# Patient Record
Sex: MALE | Race: WHITE | Employment: FULL TIME | URBAN - METROPOLITAN AREA
[De-identification: names, ages, dates, MRNs, and addresses within clinical notes are randomized per-mention and may not be internally consistent; named-entity substitution may affect disease eponyms.]

---

## 2020-12-04 ENCOUNTER — TELEPHONE (OUTPATIENT)
Dept: SURGERY | Facility: CLINIC | Age: 50
End: 2020-12-04

## 2020-12-04 NOTE — TELEPHONE ENCOUNTER
Received referral for patient to see Dr. Asad Phillip for lobectomy and possible total thyroidectomy form Dr. Laz Bonilla.  Called patient 12/4 left message to call back to schedule.    Started a chart with information provided, will need to complete chart.

## 2021-01-05 ENCOUNTER — VIRTUAL VISIT (OUTPATIENT)
Dept: SURGERY | Facility: CLINIC | Age: 51
End: 2021-01-05
Payer: COMMERCIAL

## 2021-01-05 VITALS — HEIGHT: 72 IN | WEIGHT: 260 LBS | BODY MASS INDEX: 35.21 KG/M2

## 2021-01-05 DIAGNOSIS — E04.2 NONTOXIC MULTINODULAR GOITER: ICD-10-CM

## 2021-01-05 PROCEDURE — 99204 OFFICE O/P NEW MOD 45 MIN: CPT | Performed by: SURGERY

## 2021-01-05 RX ORDER — MULTIPLE VITAMINS W/ MINERALS TAB 9MG-400MCG
1 TAB ORAL DAILY
COMMUNITY

## 2021-01-05 ASSESSMENT — MIFFLIN-ST. JEOR: SCORE: 2077.35

## 2021-01-05 NOTE — PROGRESS NOTES
Kimberly Salinas is a 50 year old male who is being evaluated via a billable telephone visit.      What phone number would you like to be contacted at? [phone]    Assessment & Plan       Pleasant healthy 50-year-old gentleman with a nontoxic multinodular goiter.  This has been relatively asymptomatic but ultrasound revealed sizable nodules in both lobes.  Biopsy of the dominant right lobe nodule suggested atypia of uncertain significance and gene sequencing was suspicious for malignancy.  Given the bilaterality of the nodules and the level of suspicion for cancer, patient was favoring total thyroidectomy.  I think this makes the most sense.  Patient did not want to have a subsequent surgery for malignancy or growing nodules.  Patient's wife and mother both take thyroid hormone replacement and he understands this is not particularly onerous.  We discussed the details of total thyroidectomy as well as the inherent risks.  Risks include bleeding, infection, hypocalcemia, and injury to the recurrent laryngeal nerve.  I did discuss my use of the recurrent laryngeal nerve monitor.  Patient was interested in scheduling surgery in the next 1 to 2 months.       BMI:   Estimated body mass index is 35.26 kg/m  as calculated from the following:    Height as of this encounter: 1.829 m (6').    Weight as of this encounter: 117.9 kg (260 lb).   Weight management plan: Discussed healthy diet and exercise guidelines      Regular exercise    No follow-ups on file.    Asad Phillip MD, MD  [facility]     Kimberly Salinas is a 50 year old male who presents to clinic today for the following health issues multinodular thyroid with suspicious features.    HPI       Pleasant healthy 50-year-old gentleman who was noted to have an enlarged thyroid.  This was asymptomatic.  Patient has no history of surgery to the head or neck.  Ultrasound of the thyroid was performed and this revealed  sizable nodules bilaterally, the largest being on the right.  The 2 largest nodules were biopsied and suggested atypia of uncertain significance.  These were then sent for gene assay and were suspicious for malignancy.  Patient has no family history of thyroid cancer but 2 family members take thyroid hormone replacement.  Patient is clinically euthyroid.  He is an avid cyclist and is quite fit.    Review of Systems   Constitutional, HEENT, cardiovascular, pulmonary, gi and gu systems are negative, except as otherwise noted.      Objective           Vitals:  No vitals were obtained today due to virtual visit.    Physical Exam   healthy, alert and no distress  PSYCH: Alert and oriented times 3; coherent speech, normal   rate and volume, able to articulate logical thoughts, able   to abstract reason, no tangential thoughts, no hallucinations   or delusions  His affect is normal  RESP: No cough, no audible wheezing, able to talk in full sentences  Remainder of exam unable to be completed due to telephone visits    Thyroid ultrasound reviewed.  Multiple sizable thyroid nodules bilaterally.        Phone call duration: 45 minutes

## 2021-01-06 ENCOUNTER — TELEPHONE (OUTPATIENT)
Dept: SURGERY | Facility: CLINIC | Age: 51
End: 2021-01-06

## 2021-01-06 NOTE — TELEPHONE ENCOUNTER
Type of surgery: Total thyroidectomy  Location of surgery: [facility]  Date and time of surgery: 2/2/21 at 12:30pm  Surgeon: Asad Padilla  Pre-Op Appt Date: Patient to schedule  Post-Op Appt Date: Patient to schedule   Packet sent out: Yes  Pre-cert/Authorization completed:  Not Applicable  Date: 1/6/21

## 2021-01-13 DIAGNOSIS — Z11.59 ENCOUNTER FOR SCREENING FOR OTHER VIRAL DISEASES: Primary | ICD-10-CM

## 2021-01-29 DIAGNOSIS — Z11.59 ENCOUNTER FOR SCREENING FOR OTHER VIRAL DISEASES: ICD-10-CM

## 2021-01-29 LAB
LABORATORY COMMENT REPORT: NORMAL
SARS-COV-2 RNA RESP QL NAA+PROBE: NEGATIVE
SARS-COV-2 RNA RESP QL NAA+PROBE: NORMAL
SPECIMEN SOURCE: NORMAL
SPECIMEN SOURCE: NORMAL

## 2021-01-29 PROCEDURE — U0003 INFECTIOUS AGENT DETECTION BY NUCLEIC ACID (DNA OR RNA); SEVERE ACUTE RESPIRATORY SYNDROME CORONAVIRUS 2 (SARS-COV-2) (CORONAVIRUS DISEASE [COVID-19]), AMPLIFIED PROBE TECHNIQUE, MAKING USE OF HIGH THROUGHPUT TECHNOLOGIES AS DESCRIBED BY CMS-2020-01-R: HCPCS | Performed by: SURGERY

## 2021-01-29 PROCEDURE — U0005 INFEC AGEN DETEC AMPLI PROBE: HCPCS | Performed by: SURGERY

## 2021-02-01 NOTE — PROGRESS NOTES
PTA medications updated by Medication Scribe prior to surgery via phone call with patient      -LAST DOSES ENTERED BY NURSE-    Comments:    Medication history sources: Patient, Surescripts and H&P  Medication history source reliability: Good  Adherence assessment: N/A Not Observed    Significant changes made to the medication list:  None      Additional medication history information:   None        Prior to Admission medications    Medication Sig Last Dose Taking? Auth Provider   GLUCOSAMINE HCL PO Take 1 Dose by mouth daily as needed  MORE THAN 1 WEEK at PRN Yes Reported, Patient   multivitamin w/minerals (MULTI-VITAMIN) tablet Take 1 tablet by mouth daily  Yes Reported, Patient   VITAMIN D PO Take 1 Dose by mouth daily   Yes Reported, Patient

## 2021-02-02 ENCOUNTER — HOSPITAL ENCOUNTER (OUTPATIENT)
Facility: CLINIC | Age: 51
Discharge: HOME | End: 2021-02-03
Attending: SURGERY | Admitting: SURGERY
Payer: COMMERCIAL

## 2021-02-02 ENCOUNTER — SURGERY (OUTPATIENT)
Age: 51
End: 2021-02-02
Payer: COMMERCIAL

## 2021-02-02 ENCOUNTER — APPOINTMENT (OUTPATIENT)
Dept: SURGERY | Facility: PHYSICIAN GROUP | Age: 51
End: 2021-02-02
Payer: COMMERCIAL

## 2021-02-02 ENCOUNTER — ANESTHESIA EVENT (OUTPATIENT)
Dept: SURGERY | Facility: CLINIC | Age: 51
End: 2021-02-02
Payer: COMMERCIAL

## 2021-02-02 ENCOUNTER — ANESTHESIA (OUTPATIENT)
Dept: SURGERY | Facility: CLINIC | Age: 51
End: 2021-02-02
Payer: COMMERCIAL

## 2021-02-02 DIAGNOSIS — E04.2 NONTOXIC MULTINODULAR GOITER: ICD-10-CM

## 2021-02-02 DIAGNOSIS — G89.18 ACUTE POST-OPERATIVE PAIN: Primary | ICD-10-CM

## 2021-02-02 LAB
CALCIUM SERPL-MCNC: 8.7 MG/DL (ref 8.5–10.1)
CREAT SERPL-MCNC: 0.96 MG/DL (ref 0.66–1.25)
GFR SERPL CREATININE-BSD FRML MDRD: >90 ML/MIN/{1.73_M2}

## 2021-02-02 PROCEDURE — 36415 COLL VENOUS BLD VENIPUNCTURE: CPT | Performed by: PHYSICIAN ASSISTANT

## 2021-02-02 PROCEDURE — 60240 REMOVAL OF THYROID: CPT | Performed by: SURGERY

## 2021-02-02 PROCEDURE — 250N000009 HC RX 250: Performed by: NURSE ANESTHETIST, CERTIFIED REGISTERED

## 2021-02-02 PROCEDURE — 710N000009 HC RECOVERY PHASE 1, LEVEL 1, PER MIN: Performed by: SURGERY

## 2021-02-02 PROCEDURE — 82565 ASSAY OF CREATININE: CPT | Performed by: PHYSICIAN ASSISTANT

## 2021-02-02 PROCEDURE — 370N000017 HC ANESTHESIA TECHNICAL FEE, PER MIN: Performed by: SURGERY

## 2021-02-02 PROCEDURE — 250N000013 HC RX MED GY IP 250 OP 250 PS 637: Performed by: SURGERY

## 2021-02-02 PROCEDURE — 250N000011 HC RX IP 250 OP 636: Performed by: SURGERY

## 2021-02-02 PROCEDURE — 258N000003 HC RX IP 258 OP 636: Performed by: PHYSICIAN ASSISTANT

## 2021-02-02 PROCEDURE — 82310 ASSAY OF CALCIUM: CPT | Performed by: PHYSICIAN ASSISTANT

## 2021-02-02 PROCEDURE — 250N000011 HC RX IP 250 OP 636: Performed by: NURSE ANESTHETIST, CERTIFIED REGISTERED

## 2021-02-02 PROCEDURE — 258N000003 HC RX IP 258 OP 636: Performed by: NURSE ANESTHETIST, CERTIFIED REGISTERED

## 2021-02-02 PROCEDURE — 250N000009 HC RX 250: Performed by: SURGERY

## 2021-02-02 PROCEDURE — 60240 REMOVAL OF THYROID: CPT | Mod: AS | Performed by: PHYSICIAN ASSISTANT

## 2021-02-02 PROCEDURE — 88307 TISSUE EXAM BY PATHOLOGIST: CPT | Mod: 26 | Performed by: PATHOLOGY

## 2021-02-02 PROCEDURE — 250N000011 HC RX IP 250 OP 636: Performed by: PHYSICIAN ASSISTANT

## 2021-02-02 PROCEDURE — 999N000141 HC STATISTIC PRE-PROCEDURE NURSING ASSESSMENT: Performed by: SURGERY

## 2021-02-02 PROCEDURE — 250N000013 HC RX MED GY IP 250 OP 250 PS 637: Performed by: PHYSICIAN ASSISTANT

## 2021-02-02 PROCEDURE — 88307 TISSUE EXAM BY PATHOLOGIST: CPT | Mod: TC | Performed by: SURGERY

## 2021-02-02 PROCEDURE — 360N000076 HC SURGERY LEVEL 3, PER MIN: Performed by: SURGERY

## 2021-02-02 PROCEDURE — 250N000011 HC RX IP 250 OP 636: Performed by: ANESTHESIOLOGY

## 2021-02-02 PROCEDURE — 272N000001 HC OR GENERAL SUPPLY STERILE: Performed by: SURGERY

## 2021-02-02 RX ORDER — ONDANSETRON 4 MG/1
4 TABLET, ORALLY DISINTEGRATING ORAL EVERY 30 MIN PRN
Status: DISCONTINUED | OUTPATIENT
Start: 2021-02-02 | End: 2021-02-02 | Stop reason: HOSPADM

## 2021-02-02 RX ORDER — SODIUM CHLORIDE, SODIUM LACTATE, POTASSIUM CHLORIDE, CALCIUM CHLORIDE 600; 310; 30; 20 MG/100ML; MG/100ML; MG/100ML; MG/100ML
INJECTION, SOLUTION INTRAVENOUS CONTINUOUS PRN
Status: DISCONTINUED | OUTPATIENT
Start: 2021-02-02 | End: 2021-02-02

## 2021-02-02 RX ORDER — NALOXONE HYDROCHLORIDE 0.4 MG/ML
0.2 INJECTION, SOLUTION INTRAMUSCULAR; INTRAVENOUS; SUBCUTANEOUS
Status: DISCONTINUED | OUTPATIENT
Start: 2021-02-02 | End: 2021-02-02 | Stop reason: HOSPADM

## 2021-02-02 RX ORDER — ONDANSETRON 2 MG/ML
INJECTION INTRAMUSCULAR; INTRAVENOUS PRN
Status: DISCONTINUED | OUTPATIENT
Start: 2021-02-02 | End: 2021-02-02

## 2021-02-02 RX ORDER — ONDANSETRON 2 MG/ML
4 INJECTION INTRAMUSCULAR; INTRAVENOUS EVERY 6 HOURS PRN
Status: DISCONTINUED | OUTPATIENT
Start: 2021-02-02 | End: 2021-02-03 | Stop reason: HOSPADM

## 2021-02-02 RX ORDER — FENTANYL CITRATE 50 UG/ML
INJECTION, SOLUTION INTRAMUSCULAR; INTRAVENOUS PRN
Status: DISCONTINUED | OUTPATIENT
Start: 2021-02-02 | End: 2021-02-02

## 2021-02-02 RX ORDER — CLINDAMYCIN PHOSPHATE 900 MG/50ML
900 INJECTION, SOLUTION INTRAVENOUS SEE ADMIN INSTRUCTIONS
Status: DISCONTINUED | OUTPATIENT
Start: 2021-02-02 | End: 2021-02-02 | Stop reason: HOSPADM

## 2021-02-02 RX ORDER — METOPROLOL TARTRATE 1 MG/ML
1-2 INJECTION, SOLUTION INTRAVENOUS EVERY 5 MIN PRN
Status: DISCONTINUED | OUTPATIENT
Start: 2021-02-02 | End: 2021-02-02 | Stop reason: HOSPADM

## 2021-02-02 RX ORDER — ONDANSETRON 2 MG/ML
4 INJECTION INTRAMUSCULAR; INTRAVENOUS EVERY 30 MIN PRN
Status: DISCONTINUED | OUTPATIENT
Start: 2021-02-02 | End: 2021-02-02 | Stop reason: HOSPADM

## 2021-02-02 RX ORDER — LEVOTHYROXINE SODIUM 100 UG/1
200 TABLET ORAL
Status: DISCONTINUED | OUTPATIENT
Start: 2021-02-03 | End: 2021-02-03 | Stop reason: HOSPADM

## 2021-02-02 RX ORDER — PROPOFOL 10 MG/ML
INJECTION, EMULSION INTRAVENOUS CONTINUOUS PRN
Status: DISCONTINUED | OUTPATIENT
Start: 2021-02-02 | End: 2021-02-02

## 2021-02-02 RX ORDER — DEXAMETHASONE SODIUM PHOSPHATE 4 MG/ML
INJECTION, SOLUTION INTRA-ARTICULAR; INTRALESIONAL; INTRAMUSCULAR; INTRAVENOUS; SOFT TISSUE PRN
Status: DISCONTINUED | OUTPATIENT
Start: 2021-02-02 | End: 2021-02-02

## 2021-02-02 RX ORDER — DEXAMETHASONE SODIUM PHOSPHATE 4 MG/ML
4 INJECTION, SOLUTION INTRA-ARTICULAR; INTRALESIONAL; INTRAMUSCULAR; INTRAVENOUS; SOFT TISSUE EVERY 10 MIN PRN
Status: DISCONTINUED | OUTPATIENT
Start: 2021-02-02 | End: 2021-02-02 | Stop reason: HOSPADM

## 2021-02-02 RX ORDER — NALOXONE HYDROCHLORIDE 0.4 MG/ML
0.4 INJECTION, SOLUTION INTRAMUSCULAR; INTRAVENOUS; SUBCUTANEOUS
Status: DISCONTINUED | OUTPATIENT
Start: 2021-02-02 | End: 2021-02-03 | Stop reason: HOSPADM

## 2021-02-02 RX ORDER — FENTANYL CITRATE 50 UG/ML
25-50 INJECTION, SOLUTION INTRAMUSCULAR; INTRAVENOUS
Status: DISCONTINUED | OUTPATIENT
Start: 2021-02-02 | End: 2021-02-02 | Stop reason: HOSPADM

## 2021-02-02 RX ORDER — HYDRALAZINE HYDROCHLORIDE 20 MG/ML
2.5-5 INJECTION INTRAMUSCULAR; INTRAVENOUS EVERY 10 MIN PRN
Status: DISCONTINUED | OUTPATIENT
Start: 2021-02-02 | End: 2021-02-02 | Stop reason: HOSPADM

## 2021-02-02 RX ORDER — NALOXONE HYDROCHLORIDE 0.4 MG/ML
0.4 INJECTION, SOLUTION INTRAMUSCULAR; INTRAVENOUS; SUBCUTANEOUS
Status: DISCONTINUED | OUTPATIENT
Start: 2021-02-02 | End: 2021-02-02 | Stop reason: HOSPADM

## 2021-02-02 RX ORDER — OXYCODONE HCL 5 MG/5 ML
5 SOLUTION, ORAL ORAL EVERY 4 HOURS PRN
Status: DISCONTINUED | OUTPATIENT
Start: 2021-02-02 | End: 2021-02-02

## 2021-02-02 RX ORDER — MAGNESIUM HYDROXIDE 1200 MG/15ML
LIQUID ORAL PRN
Status: DISCONTINUED | OUTPATIENT
Start: 2021-02-02 | End: 2021-02-02 | Stop reason: HOSPADM

## 2021-02-02 RX ORDER — SODIUM CHLORIDE 9 MG/ML
INJECTION, SOLUTION INTRAVENOUS CONTINUOUS
Status: DISCONTINUED | OUTPATIENT
Start: 2021-02-02 | End: 2021-02-03 | Stop reason: HOSPADM

## 2021-02-02 RX ORDER — OXYCODONE HYDROCHLORIDE 5 MG/1
5-10 TABLET ORAL
Qty: 10 TABLET | Refills: 0 | Status: SHIPPED | OUTPATIENT
Start: 2021-02-02 | End: 2021-02-11

## 2021-02-02 RX ORDER — SODIUM CHLORIDE, SODIUM LACTATE, POTASSIUM CHLORIDE, CALCIUM CHLORIDE 600; 310; 30; 20 MG/100ML; MG/100ML; MG/100ML; MG/100ML
INJECTION, SOLUTION INTRAVENOUS CONTINUOUS
Status: DISCONTINUED | OUTPATIENT
Start: 2021-02-02 | End: 2021-02-02 | Stop reason: HOSPADM

## 2021-02-02 RX ORDER — BUPIVACAINE HYDROCHLORIDE 2.5 MG/ML
INJECTION, SOLUTION INFILTRATION; PERINEURAL PRN
Status: DISCONTINUED | OUTPATIENT
Start: 2021-02-02 | End: 2021-02-02 | Stop reason: HOSPADM

## 2021-02-02 RX ORDER — NALOXONE HYDROCHLORIDE 0.4 MG/ML
0.2 INJECTION, SOLUTION INTRAMUSCULAR; INTRAVENOUS; SUBCUTANEOUS
Status: DISCONTINUED | OUTPATIENT
Start: 2021-02-02 | End: 2021-02-03 | Stop reason: HOSPADM

## 2021-02-02 RX ORDER — CALCIUM CARBONATE 500(1250)
1000 TABLET ORAL 2 TIMES DAILY WITH MEALS
Status: DISCONTINUED | OUTPATIENT
Start: 2021-02-02 | End: 2021-02-03 | Stop reason: HOSPADM

## 2021-02-02 RX ORDER — OXYCODONE HYDROCHLORIDE 5 MG/1
5-10 TABLET ORAL
Status: DISCONTINUED | OUTPATIENT
Start: 2021-02-02 | End: 2021-02-03 | Stop reason: HOSPADM

## 2021-02-02 RX ORDER — PROPOFOL 10 MG/ML
INJECTION, EMULSION INTRAVENOUS PRN
Status: DISCONTINUED | OUTPATIENT
Start: 2021-02-02 | End: 2021-02-02

## 2021-02-02 RX ORDER — AMOXICILLIN 250 MG
1-2 CAPSULE ORAL 2 TIMES DAILY
Qty: 30 TABLET | Refills: 0 | Status: SHIPPED | OUTPATIENT
Start: 2021-02-02 | End: 2021-02-11

## 2021-02-02 RX ORDER — LIDOCAINE HYDROCHLORIDE 20 MG/ML
INJECTION, SOLUTION INFILTRATION; PERINEURAL PRN
Status: DISCONTINUED | OUTPATIENT
Start: 2021-02-02 | End: 2021-02-02

## 2021-02-02 RX ORDER — ALBUTEROL SULFATE 0.83 MG/ML
2.5 SOLUTION RESPIRATORY (INHALATION) EVERY 4 HOURS PRN
Status: DISCONTINUED | OUTPATIENT
Start: 2021-02-02 | End: 2021-02-02 | Stop reason: HOSPADM

## 2021-02-02 RX ORDER — KETOROLAC TROMETHAMINE 15 MG/ML
15 INJECTION, SOLUTION INTRAMUSCULAR; INTRAVENOUS EVERY 6 HOURS
Status: COMPLETED | OUTPATIENT
Start: 2021-02-02 | End: 2021-02-03

## 2021-02-02 RX ORDER — HYDROMORPHONE HYDROCHLORIDE 1 MG/ML
.3-.5 INJECTION, SOLUTION INTRAMUSCULAR; INTRAVENOUS; SUBCUTANEOUS EVERY 10 MIN PRN
Status: DISCONTINUED | OUTPATIENT
Start: 2021-02-02 | End: 2021-02-02 | Stop reason: HOSPADM

## 2021-02-02 RX ORDER — GLYCOPYRROLATE 0.2 MG/ML
INJECTION, SOLUTION INTRAMUSCULAR; INTRAVENOUS PRN
Status: DISCONTINUED | OUTPATIENT
Start: 2021-02-02 | End: 2021-02-02

## 2021-02-02 RX ORDER — FAMOTIDINE 20 MG/1
20 TABLET, FILM COATED ORAL 2 TIMES DAILY
Status: DISCONTINUED | OUTPATIENT
Start: 2021-02-02 | End: 2021-02-03 | Stop reason: HOSPADM

## 2021-02-02 RX ORDER — EPHEDRINE SULFATE 50 MG/ML
INJECTION, SOLUTION INTRAMUSCULAR; INTRAVENOUS; SUBCUTANEOUS PRN
Status: DISCONTINUED | OUTPATIENT
Start: 2021-02-02 | End: 2021-02-02

## 2021-02-02 RX ORDER — LIDOCAINE 40 MG/G
CREAM TOPICAL
Status: DISCONTINUED | OUTPATIENT
Start: 2021-02-02 | End: 2021-02-03 | Stop reason: HOSPADM

## 2021-02-02 RX ORDER — CLINDAMYCIN PHOSPHATE 900 MG/50ML
900 INJECTION, SOLUTION INTRAVENOUS
Status: DISCONTINUED | OUTPATIENT
Start: 2021-02-02 | End: 2021-02-02 | Stop reason: HOSPADM

## 2021-02-02 RX ORDER — PROCHLORPERAZINE MALEATE 5 MG
10 TABLET ORAL EVERY 6 HOURS PRN
Status: DISCONTINUED | OUTPATIENT
Start: 2021-02-02 | End: 2021-02-03 | Stop reason: HOSPADM

## 2021-02-02 RX ORDER — MEPERIDINE HYDROCHLORIDE 25 MG/ML
12.5 INJECTION INTRAMUSCULAR; INTRAVENOUS; SUBCUTANEOUS
Status: DISCONTINUED | OUTPATIENT
Start: 2021-02-02 | End: 2021-02-02 | Stop reason: HOSPADM

## 2021-02-02 RX ORDER — ONDANSETRON 4 MG/1
4 TABLET, ORALLY DISINTEGRATING ORAL EVERY 6 HOURS PRN
Status: DISCONTINUED | OUTPATIENT
Start: 2021-02-02 | End: 2021-02-03 | Stop reason: HOSPADM

## 2021-02-02 RX ORDER — HYDROXYZINE HYDROCHLORIDE 10 MG/1
10 TABLET, FILM COATED ORAL EVERY 6 HOURS PRN
Status: DISCONTINUED | OUTPATIENT
Start: 2021-02-02 | End: 2021-02-03 | Stop reason: HOSPADM

## 2021-02-02 RX ADMIN — SUCCINYLCHOLINE CHLORIDE 80 MG: 20 INJECTION, SOLUTION INTRAMUSCULAR; INTRAVENOUS; PARENTERAL at 12:25

## 2021-02-02 RX ADMIN — Medication 10 MG: at 13:04

## 2021-02-02 RX ADMIN — HYDROMORPHONE HYDROCHLORIDE 0.5 MG: 1 INJECTION, SOLUTION INTRAMUSCULAR; INTRAVENOUS; SUBCUTANEOUS at 14:38

## 2021-02-02 RX ADMIN — SODIUM CHLORIDE: 9 INJECTION, SOLUTION INTRAVENOUS at 18:22

## 2021-02-02 RX ADMIN — Medication 5 MG: at 13:07

## 2021-02-02 RX ADMIN — SODIUM CHLORIDE, POTASSIUM CHLORIDE, SODIUM LACTATE AND CALCIUM CHLORIDE: 600; 310; 30; 20 INJECTION, SOLUTION INTRAVENOUS at 12:15

## 2021-02-02 RX ADMIN — Medication 5 MG: at 13:28

## 2021-02-02 RX ADMIN — REMIFENTANIL HYDROCHLORIDE 0.1 MCG/KG/MIN: 1 INJECTION, POWDER, LYOPHILIZED, FOR SOLUTION INTRAVENOUS at 12:30

## 2021-02-02 RX ADMIN — SODIUM CHLORIDE 1000 ML: 900 IRRIGANT IRRIGATION at 12:55

## 2021-02-02 RX ADMIN — Medication 10 MG: at 12:48

## 2021-02-02 RX ADMIN — KETOROLAC TROMETHAMINE 15 MG: 15 INJECTION, SOLUTION INTRAMUSCULAR; INTRAVENOUS at 23:29

## 2021-02-02 RX ADMIN — PHENYLEPHRINE HYDROCHLORIDE 50 MCG: 10 INJECTION INTRAVENOUS at 13:28

## 2021-02-02 RX ADMIN — KETOROLAC TROMETHAMINE 15 MG: 15 INJECTION, SOLUTION INTRAMUSCULAR; INTRAVENOUS at 18:22

## 2021-02-02 RX ADMIN — Medication 5 MG: at 14:43

## 2021-02-02 RX ADMIN — PROPOFOL 200 MCG/KG/MIN: 10 INJECTION, EMULSION INTRAVENOUS at 12:29

## 2021-02-02 RX ADMIN — PHENYLEPHRINE HYDROCHLORIDE 50 MCG: 10 INJECTION INTRAVENOUS at 13:07

## 2021-02-02 RX ADMIN — CLINDAMYCIN PHOSPHATE 900 MG: 900 INJECTION, SOLUTION INTRAVENOUS at 12:35

## 2021-02-02 RX ADMIN — FAMOTIDINE 20 MG: 20 TABLET ORAL at 20:29

## 2021-02-02 RX ADMIN — DEXMEDETOMIDINE HYDROCHLORIDE 20 MCG: 100 INJECTION, SOLUTION INTRAVENOUS at 15:16

## 2021-02-02 RX ADMIN — SODIUM CHLORIDE, SODIUM LACTATE, POTASSIUM CHLORIDE, CALCIUM CHLORIDE: 600; 310; 30; 20 INJECTION, SOLUTION INTRAVENOUS at 12:30

## 2021-02-02 RX ADMIN — PROPOFOL 300 MG: 10 INJECTION, EMULSION INTRAVENOUS at 12:25

## 2021-02-02 RX ADMIN — LIDOCAINE HYDROCHLORIDE 100 MG: 20 INJECTION, SOLUTION INFILTRATION; PERINEURAL at 12:25

## 2021-02-02 RX ADMIN — Medication 5 MG: at 12:52

## 2021-02-02 RX ADMIN — MIDAZOLAM HYDROCHLORIDE 2 MG: 1 INJECTION, SOLUTION INTRAMUSCULAR; INTRAVENOUS at 15:18

## 2021-02-02 RX ADMIN — ONDANSETRON 4 MG: 2 INJECTION INTRAMUSCULAR; INTRAVENOUS at 14:41

## 2021-02-02 RX ADMIN — BUPIVACAINE HYDROCHLORIDE 18 ML: 2.5 INJECTION, SOLUTION EPIDURAL; INFILTRATION; INTRACAUDAL; PERINEURAL at 14:55

## 2021-02-02 RX ADMIN — DEXAMETHASONE SODIUM PHOSPHATE 4 MG: 4 INJECTION, SOLUTION INTRA-ARTICULAR; INTRALESIONAL; INTRAMUSCULAR; INTRAVENOUS; SOFT TISSUE at 12:31

## 2021-02-02 RX ADMIN — FENTANYL CITRATE 100 MCG: 50 INJECTION, SOLUTION INTRAMUSCULAR; INTRAVENOUS at 12:25

## 2021-02-02 RX ADMIN — Medication 1 LOZENGE: at 21:13

## 2021-02-02 RX ADMIN — CALCIUM 1000 MG: 500 TABLET ORAL at 20:29

## 2021-02-02 RX ADMIN — GLYCOPYRROLATE 0.1 MG: 0.2 INJECTION, SOLUTION INTRAMUSCULAR; INTRAVENOUS at 13:10

## 2021-02-02 RX ADMIN — MIDAZOLAM 2 MG: 1 INJECTION INTRAMUSCULAR; INTRAVENOUS at 12:18

## 2021-02-02 RX ADMIN — GLYCOPYRROLATE 0.2 MG: 0.2 INJECTION, SOLUTION INTRAMUSCULAR; INTRAVENOUS at 13:08

## 2021-02-02 ASSESSMENT — MIFFLIN-ST. JEOR: SCORE: 2136.77

## 2021-02-02 NOTE — OR NURSING
Talked to Anthony LOAIZA regarding blood oozing from patient's urethra. Blood present on arrival to pacu. Weiss was removed in the OR prior to pacu arrival. Patient reports urge to void, assisted with urinal but no produced no urine. Per Anthony, too much blood would be in patient oozing through athletic supporter and 5 layers of gauze. States we may use Urojet if patient reports soreness to penis tip, but patient declines.

## 2021-02-02 NOTE — ANESTHESIA PREPROCEDURE EVALUATION
"Anesthesia Pre-Procedure Evaluation    Patient: Kimberly Salinas   MRN: [id number] : 1970        Preoperative Diagnosis: Nontoxic multinodular goiter [E04.2]   Procedure : Procedure(s):  TOTAL THYROIDECTOMY     Past Medical History:   Diagnosis Date     Enlargement of thyroid       Past Surgical History:   Procedure Laterality Date     ENT SURGERY      wisdom teeth removal     neck lumpectomy      benign     ORTHOPEDIC SURGERY      ORIF left wrist      Allergies   Allergen Reactions     Penicillins      Per patient taken as a child and \"almost killed\" him.      Social History     Tobacco Use     Smoking status: Never Smoker     Smokeless tobacco: Never Used   Substance Use Topics     Alcohol use: Yes     Comment: 2-4 times a week      Wt Readings from Last 1 Encounters:   21 123.9 kg (273 lb 1.6 oz)        Anesthesia Evaluation            ROS/MED HX  ENT/Pulmonary: Comment: Multinodular goiter suspicious for malignancy   (-) sleep apnea   Neurologic:       Cardiovascular:       METS/Exercise Tolerance:     Hematologic:       Musculoskeletal:       GI/Hepatic:    (-) GERD   Renal/Genitourinary:       Endo:       Psychiatric/Substance Use:       Infectious Disease:       Malignancy:       Other:               OUTSIDE LABS:  CBC: No results found for: WBC, HGB, HCT, PLT  BMP: No results found for: NA, POTASSIUM, CHLORIDE, CO2, BUN, CR, GLC  COAGS: No results found for: PTT, INR, FIBR  POC: No results found for: BGM, HCG, HCGS  HEPATIC: No results found for: ALBUMIN, PROTTOTAL, ALT, AST, GGT, ALKPHOS, BILITOTAL, BILIDIRECT, ANITA  OTHER: No results found for: PH, LACT, A1C, MARYA, PHOS, MAG, LIPASE, AMYLASE, TSH, T4, T3, CRP, SED    Anesthesia Plan     History & Physical Review      ASA Status:  2. NPO Status:  NPO Appropriate. .  Plan for General with Intravenous induction. Maintenance will be Balanced.     Additional equipment: Videolaryngoscope and 2nd IV.    PONV prophylaxis:  Ondansetron (or other " 5HT-3), Background Propofol Infusion and Dexamethasone or Solumedrol.    Comments: Propofol and Reminfentanil gtt.       Consents  Anesthesia Plan(s) and associated risks, benefits, and realistic alternatives discussed.    Questions answered and patient/representative(s) expressed understanding.    Discussed with:  Patient.             Postoperative Care  Postoperative pain management: IV analgesics and Oral pain medications.           Ivonne Stafford MD, MD

## 2021-02-02 NOTE — ANESTHESIA CARE TRANSFER NOTE
Patient: Kimberly Salinas    Procedure(s):  TOTAL THYROIDECTOMY    Diagnosis: Nontoxic multinodular goiter [E04.2]  Diagnosis Additional Information: No value filed.    Anesthesia Type:   General     Note:      Level of Consciousness: awake  Oxygen Supplementation: face mask  Level of Supplemental Oxygen: 6  Independent Airway: airway patency satisfactory and stable  Dentition: dentition unchanged  Vital Signs Stable: post-procedure vital signs reviewed and stable  Report to RN Given: handoff report given  Patient transferred to: PACU    Handoff Report: Identifed the Patient, Identified the Reponsible Provider, Reviewed the pertinent medical history, Discussed the surgical course, Reviewed Intra-OP anesthesia mangement and issues during anesthesia, Set expectations for post-procedure period and Allowed opportunity for questions and acknowledgement of understanding      Vitals: (Last set prior to Anesthesia Care Transfer)  CRNA VITALS  2/2/2021 1437 - 2/2/2021 1521      2/2/2021             Pulse:  90    SpO2:  94 %    Resp Rate (set):  10        Electronically Signed By: EVERARDO Blakely CRNA  February 2, 2021  3:21 PM

## 2021-02-02 NOTE — OR NURSING
Report called to Arabella NAJERA. Patient transferred to [facility] on cart with belongings. Dr. Murry OK to transfer to floor with /100. Patient states this is his baseline BP. Wife Marge notified of transfer.

## 2021-02-02 NOTE — ANESTHESIA POSTPROCEDURE EVALUATION
Patient: Kimberly Salinas    Procedure(s):  TOTAL THYROIDECTOMY    Diagnosis:Nontoxic multinodular goiter [E04.2]  Diagnosis Additional Information: No value filed.    Anesthesia Type:  General    Note:     Postop Pain Control: Uneventful            Sign Out: Well controlled pain   PONV: No   Neuro/Psych: Uneventful            Sign Out: Acceptable/Baseline neuro status   Airway/Respiratory: Uneventful            Sign Out: Acceptable/Baseline resp. status   CV/Hemodynamics: Uneventful            Sign Out: Acceptable CV status   Other NRE: NONE   DID A NON-ROUTINE EVENT OCCUR? No         Last vitals:  Vitals:    02/02/21 1545 02/02/21 1600 02/02/21 1615   BP: (!) 161/103 (!) 157/106 (!) 158/100   Pulse: 92 71 66   Resp: 19 20 16   Temp:   36.5  C (97.7  F)   SpO2: 97% 94% 99%       Electronically Signed By: Ivonne Stafford MD, MD  February 2, 2021  4:21 PM

## 2021-02-02 NOTE — ANESTHESIA PROCEDURE NOTES
Airway   Date/Time: 2/2/2021 12:29 PM   Patient location during procedure: OR  Staff -   CRNA: Rika Mayen APRN CRNA  Performed By: CRNA    Consent for Airway   Urgency: elective    Indications and Patient Condition  Indications for airway management: trice-procedural  Induction type:RSIMask difficulty assessment: 0 - not attempted    Final Airway Details  Final airway type: endotracheal airway  Successful airway:NIM  Endotracheal Airway Details   ETT size (mm): 8.0  Cuffed: yes  Successful intubation technique: video laryngoscopy  Grade View of Cords: 1  Adjucts: stylet  Measured from: gums/teeth  Secured with: pink tape  Bite block used: None    Post intubation assessment   Placement verified by: capnometry, equal breath sounds and chest rise   Number of attempts at approach: 1  Secured with:pink tape  Ease of procedure: easy  Dentition: Intact and Unchanged

## 2021-02-03 VITALS
TEMPERATURE: 97.6 F | OXYGEN SATURATION: 94 % | WEIGHT: 273.1 LBS | HEART RATE: 48 BPM | DIASTOLIC BLOOD PRESSURE: 86 MMHG | SYSTOLIC BLOOD PRESSURE: 137 MMHG | RESPIRATION RATE: 16 BRPM | HEIGHT: 72 IN | BODY MASS INDEX: 36.99 KG/M2

## 2021-02-03 LAB
ANION GAP SERPL CALCULATED.3IONS-SCNC: 6 MMOL/L (ref 3–14)
BUN SERPL-MCNC: 14 MG/DL (ref 7–30)
CALCIUM SERPL-MCNC: 8.6 MG/DL (ref 8.5–10.1)
CHLORIDE SERPL-SCNC: 102 MMOL/L (ref 94–109)
CO2 SERPL-SCNC: 25 MMOL/L (ref 20–32)
CREAT SERPL-MCNC: 0.91 MG/DL (ref 0.66–1.25)
ERYTHROCYTE [DISTWIDTH] IN BLOOD BY AUTOMATED COUNT: 13.5 % (ref 10–15)
GFR SERPL CREATININE-BSD FRML MDRD: >90 ML/MIN/{1.73_M2}
GLUCOSE SERPL-MCNC: 105 MG/DL (ref 70–99)
HCT VFR BLD AUTO: 43.2 % (ref 40–53)
HGB BLD-MCNC: 14.8 G/DL (ref 13.3–17.7)
MCH RBC QN AUTO: 28.4 PG (ref 26.5–33)
MCHC RBC AUTO-ENTMCNC: 34.3 G/DL (ref 31.5–36.5)
MCV RBC AUTO: 83 FL (ref 78–100)
PLATELET # BLD AUTO: 217 10E9/L (ref 150–450)
POTASSIUM SERPL-SCNC: 4.3 MMOL/L (ref 3.4–5.3)
RBC # BLD AUTO: 5.22 10E12/L (ref 4.4–5.9)
SODIUM SERPL-SCNC: 133 MMOL/L (ref 133–144)
WBC # BLD AUTO: 16.3 10E9/L (ref 4–11)

## 2021-02-03 PROCEDURE — 250N000011 HC RX IP 250 OP 636: Performed by: PHYSICIAN ASSISTANT

## 2021-02-03 PROCEDURE — 36415 COLL VENOUS BLD VENIPUNCTURE: CPT | Performed by: PHYSICIAN ASSISTANT

## 2021-02-03 PROCEDURE — 250N000013 HC RX MED GY IP 250 OP 250 PS 637: Performed by: PHYSICIAN ASSISTANT

## 2021-02-03 PROCEDURE — 85027 COMPLETE CBC AUTOMATED: CPT | Performed by: PHYSICIAN ASSISTANT

## 2021-02-03 PROCEDURE — 82947 ASSAY GLUCOSE BLOOD QUANT: CPT | Performed by: PHYSICIAN ASSISTANT

## 2021-02-03 PROCEDURE — 80048 BASIC METABOLIC PNL TOTAL CA: CPT | Performed by: PHYSICIAN ASSISTANT

## 2021-02-03 RX ORDER — LEVOTHYROXINE SODIUM 200 UG/1
200 TABLET ORAL
Qty: 30 TABLET | Refills: 1 | Status: SHIPPED | OUTPATIENT
Start: 2021-02-03

## 2021-02-03 RX ORDER — CALCIUM CARBONATE 500(1250)
1000 TABLET ORAL 2 TIMES DAILY WITH MEALS
Qty: 40 TABLET | Refills: 0 | Status: SHIPPED | OUTPATIENT
Start: 2021-02-03 | End: 2021-02-11

## 2021-02-03 RX ADMIN — KETOROLAC TROMETHAMINE 15 MG: 15 INJECTION, SOLUTION INTRAMUSCULAR; INTRAVENOUS at 11:01

## 2021-02-03 RX ADMIN — LEVOTHYROXINE SODIUM 200 MCG: 100 TABLET ORAL at 08:25

## 2021-02-03 RX ADMIN — FAMOTIDINE 20 MG: 20 TABLET ORAL at 08:24

## 2021-02-03 RX ADMIN — CALCIUM 1000 MG: 500 TABLET ORAL at 08:25

## 2021-02-03 RX ADMIN — KETOROLAC TROMETHAMINE 15 MG: 15 INJECTION, SOLUTION INTRAMUSCULAR; INTRAVENOUS at 05:51

## 2021-02-03 NOTE — PLAN OF CARE
Txf from PACU @ 1700. VSS ex HTN. A/O. SBA. Neck incision CDI. Denies pain. Tolerating diet. Bloody urine, MD aware, slowly clearing. Monitor.

## 2021-02-03 NOTE — PROVIDER NOTIFICATION
MD Notification    Notified Person: MD    Notified Person Name: shreya    Notification Date/Time: 2/2/2021 2807    Notification Interaction: paged    Purpose of Notification:  Increase blood in urine with clots    Orders Received:    Continue to monitor. If pt. Continues to have blood/ clots possible  urology  To assess. Notify provider if pt. Starts to have problems urinating or is unable to urinate.     Comments:

## 2021-02-03 NOTE — PROVIDER NOTIFICATION
MD Notification    Notified Person: MD    Notified Person Name: Kristy    Notification Date/Time: 2/2/2021 2147    Notification Interaction: [employer]    Purpose of Notification:    Room 312 H.D.    increase in blood in urine. some clots noted. Pt. endorses less painful than before.    Gosia Colindres RN    Orders Received:    No response    Comments:

## 2021-02-03 NOTE — DISCHARGE INSTRUCTIONS
[facility]  Discharge Instructions: Post-Operative Thyroid Surgery    ACTIVITY    Take frequent, short walks and increase your activity gradually.      Avoid strenuous physical activity or heavy lifting greater than 15-20 lbs. for 1-2 weeks.  You may climb stairs.    You may drive without restrictions when you are not using any prescription pain medication and feel comfortable in a car.    You may return to work/school when you are comfortable without any prescription pain medication.    WOUND CARE    You may remove your bandage and shower 48 hours after the surgery.  Pat your incision dry and leave it open to air.  Re-apply dressing (Band-Aids or gauze/tape) as needed for comfort or drainage.    You may have steri-strips (looks like white tape) on your incision.  You may peel off the steri-strips 2 weeks after your surgery if they have not peeled off on their own.     Do not soak your incision in a tub or pool for 2 weeks.     Do not apply any lotions, creams, or ointments to your incision.    A ridge under your incision is normal and will gradually resolve.    DIET    Start with liquids, then gradually resume your regular diet as tolerated.    Drink plenty of fluids to stay hydrated.    PAIN    Expect some tenderness and discomfort at the incision site(s).  Use the prescribed pain medication at your discretion.  Expect gradual resolution of your pain over several days.    You may take ibuprofen with food (unless you have been told not to) instead of or in addition to your prescribed pain medication.  If you are taking Norco or Percocet, do not take any additional acetaminophen/APAP/Tylenol.    Do not drink alcohol or drive while you are taking pain medications.    You may apply ice to your incisions in 20 minute intervals as needed for the next 48 hours.  After that time, consider switching to heat if you prefer.     Watch for symptoms of numbness or tingling around the  mouth or in the fingers or toes.  This may be a sign of a low calcium level.  Please contact the office so we can evaluate your symptoms.  You may need to have your blood calcium level checked by doing a simple blood test.    EXPECTATIONS    Pain medications can cause constipation.  Limit use when possible.  Take over the counter stool softener/stimulant, such as Colace or Senna, 1-2 times a day with plenty of water.  You may take a mild over the counter laxative, such as Miralax or a suppository, as needed.      You may discontinue these medications once you are having regular bowel movements and/or are no longer taking your narcotic pain medication.     RETURN APPOINTMENT    Follow up with your surgeon in 2 weeks.  Please call our office at [phone] to schedule your appointment.  We are located at [address] [place] [address].    CALL OUR OFFICE -[phone] IF YOU HAVE:     Chills or fever above 101 F.    Increased redness, warmth, or drainage at your incisions.    Significant bleeding.    Pain not relieved by your pain medication or rest.    Increasing pain after the first 48 hours.    Any other concerns or questions.    Revised May 2018

## 2021-02-03 NOTE — PROGRESS NOTES
Surgery    No complaints  Minimal pain  Awaiting bowel function   Voiding ok. No hematuria.  Tolerating diet  Voice stronger this morning  Walking   Denies CP, dyspnea    Gen:  Awake, Alert, NAD  Blood pressure 137/86, pulse (!) 48, temperature 97.6  F (36.4  C), temperature source Oral, resp. rate 16, height 1.829 m (6'), weight 123.9 kg (273 lb 1.6 oz), SpO2 94 %.  Neck - non swelling or hematoma, voice slightly weak.  Resp - Non-labored.  Extremities - no lower extremity edema    Calcium normal  Labs WNL    A/P total thyroidectomy    - ADAT  - monitor hematuria  - continue calcium, thyroid hormone replacement  - encourage incentive spirometer use  - ambulate  - dispo: home today  - instructed  -Follow up with Dr[facility] at [facility] in about 2 weeks.  Call [phone] to schedule an appointment.     Anthony Simmons PA-C  Office: [phone]  Pager: [phone]

## 2021-02-03 NOTE — PLAN OF CARE
A+O x 4. Independent in room. Post-op day 1 of total thyroidectomy. Dressing is CDI. Bradycardic at baseline. CMS intact. VSS on RA. Bloody urine has resolved. Regular diet. Full code. Should discharge today to home.

## 2021-02-03 NOTE — DISCHARGE SUMMARY
[facility] Discharge Summary    Kimberly Salinas MRN# [id number]   Age: 50 year old YOB: 1970     Date of Admission:  2/2/2021  Date of Discharge::  2/3/2021 12:00 PM  Admitting Physician:  Asad Phillip MD  Discharge Physician:  Same             Admission Diagnoses:   Nontoxic multinodular goiter [E04.2]          Discharge Diagnosis:   Nontoxic multinodular goiter [E04.2]          Procedures:   Procedure(s): Total thyroidectomy            Medications Prior to Admission:     No medications prior to admission.             Discharge Medications:     Discharge Medication List as of 2/3/2021 11:37 AM      START taking these medications    Details   calcium carbonate 500 mg, elemental, (OSCAL) 500 MG tablet Take 2 tablets (1,000 mg) by mouth 2 times daily (with meals), Disp-40 tablet, R-0, E-Prescribe      levothyroxine (SYNTHROID/LEVOTHROID) 200 MCG tablet Take 1 tablet (200 mcg) by mouth every morning (before breakfast), Disp-30 tablet, R-1, E-Prescribe      oxyCODONE (ROXICODONE) 5 MG tablet Take 1-2 tablets (5-10 mg) by mouth every 3 hours as needed for pain (Moderate to Severe), Disp-10 tablet, R-0, E-Prescribe      senna-docusate (SENOKOT-S/PERICOLACE) 8.6-50 MG tablet Take 1-2 tablets by mouth 2 times daily Take while on oral narcotics to prevent or treat constipation., Disp-30 tablet, R-0, E-PrescribeWhile on narcotics         CONTINUE these medications which have NOT CHANGED    Details   GLUCOSAMINE HCL PO Take 1 Dose by mouth daily as needed , Historical      multivitamin w/minerals (MULTI-VITAMIN) tablet Take 1 tablet by mouth daily, Historical      VITAMIN D PO Take 1 Dose by mouth daily , Historical                   Consultations:   No consultations were requested during this admission          Brief History of Illness:   Kimberly presented with palpable nodules in s thyroid.  After ultrasound and biopsy, he discussed with Dr. Phillip the risks and benefits of total  thyroidectomy, and chose to proceed.  He was admitted 2/2/2021 for the above procedure and recovered uneventfully overnight.  On discharge, he was eating, vocalizing well, and pain was controlled with oral medications.          Discharge Instructions and Follow-Up:   Discharge diet: Regular   Discharge activity: Activity as tolerated   Discharge follow-up: Follow up with Dr. Phillip in 2 weeks   Wound care: Keep wound clean and dry  May get incision wet in shower but do not soak or scrub  Steri-strips will fall off on their own           Discharge Disposition:   Discharged to home        Nkechi Garcia MD

## 2021-02-03 NOTE — PLAN OF CARE
[id number]: A&Ox4. VSS ex. Bradycardic at times on RA. Denies pain. Regular diet. Denies nausea. Up IND, steady gait, denies feeling lightheaded or dizzy. Throat incision CDI. CMS intact. Voiding adequately in BR. Bloody urine with clots, MD notified, see note. PIV SL. Continue to monitor.

## 2021-02-03 NOTE — OP NOTE
General Surgery Operative Note    PREOPERATIVE DIAGNOSIS:  Nontoxic multinodular goiter [E04.2]    POSTOPERATIVE DIAGNOSIS:  Same    PROCEDURE:  Total thyroidectomy with recurrent laryngeal nerve monitor.    ANESTHESIA:  General.    PREOPERATIVE MEDICATIONS:  Ancef IV.    SURGEON:  Asad Phillip MD, MD    ASSISTANT:  Anthony Simmons PA-C. First assistant was necessary due to challenging exposure and the need for improved visualization and help maintaining hemostasis.    ESTIMATED BLOOD LOSS:  15 cc's    INDICATIONS:  Kimberly Salinas is a 50 year old male who has had an enlarged thyroid gland with a suspicious nodule on the right.  He has also had symptoms including swallowing difficulties.  He now presents for total thyroidectomy.    DESCRIPTION OF PROCEDURE:  The patient was placed supine, head and neck in extension and a bump between the scapulae.  Transverse cervical neck creases were marked in the preinduction area and the one most suitable was utilized for exposure.  Superior and inferior skin flaps raised.  Midline fascia opened and reflected to the right. The upper pole was taken down by double ligation and division.  Middle thyroidal vein and inferior pole veins were ligated and the gland reflected medially.  The superior parathyroid and recurrent laryngeal nerve were readily seen and preserved.  Nerve conduction was confirmed with nerve monitor.  The posterior dissection was meticulously done to ligate and divide the inferior thyroidal artery and the ligament of Berry.  The inferior parathyroid was seen and preserved.  We then proceeded with the left thyroid lobectomy.  The upper pole was taken down by double ligation and division.  Middle thyroidal vein and inferior pole veins were ligated and the gland reflected medially.  The superior parathyroid and recurrent laryngeal nerve were readily seen and preserved.  The posterior dissection was meticulously done to ligate and divide the inferior  thyroidal artery and the ligament of Berry.  The inferior parathyroid was seen and preserved.  Once the remaining attachments were divided, the gland was oriented for pathology and submitted for permanent sections.  The site was inspected for hemostasis, irrigated and closed using running 3-0 Vicryl for the midline fascia, interrupted for platysma and 4-0 subcuticular Monocryl for skin.  The patient transferred to recovery in good condition.    INTRAOPERATIVE FINDINGS:  1.  Nodular thyroid gland.  2.  Both recurrent laryngeal nerves seen and preserved.  Nerve conduction confirmed with nerve monitor.  3.  Right superior parathyroid, right inferior parathyroid, left superior, and left inferior parathyroid seen and preserved.      Specimens:   ID Type Source Tests Collected by Time Destination   A : TOTAL THYROIDECTOMY, STITCH AT RIGHT SUPERIOR POLE Tissue Thyroid SURGICAL PATHOLOGY EXAM Asad Phillip MD 2/2/2021  1:12 PM        Asad Phillip MD, MD

## 2021-02-04 ENCOUNTER — TELEPHONE (OUTPATIENT)
Dept: SURGERY | Facility: CLINIC | Age: 51
End: 2021-02-04

## 2021-02-04 LAB — COPATH REPORT: NORMAL

## 2021-02-04 NOTE — RESULT ENCOUNTER NOTE
I have already discussed the results with the patient.  Can we get him set up for f/u (virtual or in person) next week?

## 2021-02-04 NOTE — TELEPHONE ENCOUNTER
Procedure:  Total thyroidectomy    Date:  02/02/2021    Surgeon:  Kenji      Patient reporting some tingling in lips as well as fingers. Intermittently. First noticed this morning.    Per Dr. Phillip:    Patient should take another dose of calcium now. Dr. Phillip will call patient later today to discuss pathology and determine if labs or any other interventions are warranted.    Also, informed patient that he only needs to wait one hour to take calcium in the morning after taking synthroid vs four hours.    He verbalized understanding. Will take another dose of calcium and will wait for surgeon's call.    Maggie Barrera, RN-BSN

## 2021-02-04 NOTE — TELEPHONE ENCOUNTER
Patient had total thyroidectomy 2/2/21MRG is having some tingling in fingertips and toes and some in lips, mild, comes and goes. He was told to call and report it if it happened.    Please call    Phone: [phone]

## 2021-02-11 ENCOUNTER — HOSPITAL ENCOUNTER (OUTPATIENT)
Dept: LAB | Facility: CLINIC | Age: 51
Discharge: HOME | End: 2021-02-11
Attending: SURGERY | Admitting: SURGERY
Payer: COMMERCIAL

## 2021-02-11 ENCOUNTER — TRANSFERRED RECORDS (OUTPATIENT)
Dept: HEALTH INFORMATION MANAGEMENT | Facility: CLINIC | Age: 51
End: 2021-02-11

## 2021-02-11 ENCOUNTER — OFFICE VISIT (OUTPATIENT)
Dept: SURGERY | Facility: CLINIC | Age: 51
End: 2021-02-11
Payer: COMMERCIAL

## 2021-02-11 DIAGNOSIS — E04.2 NONTOXIC MULTINODULAR GOITER: ICD-10-CM

## 2021-02-11 DIAGNOSIS — G89.18 ACUTE POST-OPERATIVE PAIN: ICD-10-CM

## 2021-02-11 DIAGNOSIS — E89.0 STATUS POST THYROIDECTOMY: ICD-10-CM

## 2021-02-11 DIAGNOSIS — E04.2 NONTOXIC MULTINODULAR GOITER: Primary | ICD-10-CM

## 2021-02-11 DIAGNOSIS — Z09 SURGERY FOLLOW-UP EXAMINATION: ICD-10-CM

## 2021-02-11 LAB — CA-I SERPL ISE-MCNC: 3.8 MG/DL (ref 4.4–5.2)

## 2021-02-11 PROCEDURE — 36415 COLL VENOUS BLD VENIPUNCTURE: CPT | Performed by: SURGERY

## 2021-02-11 PROCEDURE — 99024 POSTOP FOLLOW-UP VISIT: CPT | Performed by: SURGERY

## 2021-02-11 PROCEDURE — 82330 ASSAY OF CALCIUM: CPT | Performed by: SURGERY

## 2021-02-11 RX ORDER — CALCIUM CARBONATE 500(1250)
1000 TABLET ORAL 2 TIMES DAILY WITH MEALS
Qty: 40 TABLET | Refills: 3 | Status: SHIPPED | OUTPATIENT
Start: 2021-02-11

## 2021-02-11 NOTE — PROGRESS NOTES
Surgery Postop Note    Kimberly Salinas presents today for surgical followup.  he is doing well following total thyroidectomy.  Incisions look fine with no signs of wound infection.  He initially had some symptoms of hypocalcemia but says that this has been improving day by day.  Also had some self-described scratchiness in his voice, but this has also been improving.  He has been whispering in an attempt to allow his voice to recover and feels that he is made significant progress.  He is eager to increase his level of activity and exercise and I have given him the okay for this.  Final pathology was completely benign.  We checked his calcium after his clinic visit and this was still mildly elevated, showing a need for continued calcium replacement.  I will check back in with him next week and recheck his calcium at that point.  I expect him to make a complete recovery.  Thank you for the opportunity to help in his care.    Jamel Phillip M.D.  [facility]  [phone]    Please route or send letter to:  Primary Care Provider

## 2021-02-11 NOTE — LETTER
[facility]    [address] [facility], [address]  [place], [place] [address]  Phone [phone]  Fax [phone] [address], [address]  [place] [address]  Phone [phone]  Fax [phone]    [email or web address]     February 15, 2021      Re: Kimberly Salinas  : 1970      Surgery Postop Note     Kimberly Salinas presents today for surgical followup.  he is doing well following total thyroidectomy.  Incisions look fine with no signs of wound infection.  He initially had some symptoms of hypocalcemia but says that this has been improving day by day.  Also had some self-described scratchiness in his voice, but this has also been improving.  He has been whispering in an attempt to allow his voice to recover and feels that he is made significant progress.  He is eager to increase his level of activity and exercise and I have given him the okay for this.  Final pathology was completely benign.  We checked his calcium after his clinic visit and this was still mildly elevated, showing a need for continued calcium replacement.  I will check back in with him next week and recheck his calcium at that point.  I expect him to make a complete recovery.  Thank you for the opportunity to help in his care.     Jamel Phillip M.D.  [facility]  [phone]

## 2021-02-25 ENCOUNTER — VIRTUAL VISIT (OUTPATIENT)
Dept: SURGERY | Facility: CLINIC | Age: 51
End: 2021-02-25
Payer: COMMERCIAL

## 2021-02-25 DIAGNOSIS — Z09 SURGERY FOLLOW-UP EXAMINATION: Primary | ICD-10-CM

## 2021-02-25 PROCEDURE — 99024 POSTOP FOLLOW-UP VISIT: CPT | Performed by: SURGERY

## 2021-02-25 NOTE — LETTER
[facility]    [address] [facility], [address]  [place], [place] [address]  Phone [phone]  Fax [phone] [address], [address]  [place] [address]  Phone [phone]  Fax [phone]    [email or web address]     2021    Re: Kimberly Salinas  : 1970      [facility] Post-op Note    Patient underwent total thyroidectomy for suspicious thyroid lesions approximately 5 weeks ago.  He is recovering well.  His voice is nearly normal now and he is feeling no signs of hypocalcemia.  He is down to 1 Os-Xu tablet per day.  Mild fatigue but he is not sure if this is related to his thyroid replacement or his decreased exercise regimen.  Continues to have occasional clots in his urine after exercise.     Review of Systems   Constitutional, HEENT, cardiovascular, pulmonary, gi and gu systems are negative, except as otherwise noted.     Vitals:  No vitals were obtained today due to virtual visit.     Physical Exam   GENERAL: Healthy, alert and no distress  EYES: Eyes grossly normal to inspection.  No discharge or erythema, or obvious scleral/conjunctival abnormalities.  RESP: No audible wheeze, cough, or visible cyanosis.  No visible retractions or increased work of breathing.    SKIN: Visible skin clear. No significant rash, abnormal pigmentation or lesions.  Thyroid incision appears to be healing well.  NEURO: Cranial nerves grossly intact.  Mentation and speech appropriate for age.  PSYCH: Mentation appears normal, affect normal/bright, judgement and insight intact, normal speech and appearance well-groomed.     Assessment and plan:     Patient is recovering well from total thyroidectomy.  Fortunately, pathology was benign.  His voice is normal and he is interested in getting back to his previous level of activity.  No signs of Hypocalcemia.  He will follow up with his endocrinologist for finalization of  his thyroid dose.      Asad Phillip MD

## 2021-02-25 NOTE — LETTER
[facility]    [address] [facility], [address]  [place], [place] [address]  Phone [phone]  Fax [phone] 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     ANESTHESIA:  General.     PREOPERATIVE MEDICATIONS:  Ancef IV.     SURGEON:  Asad Phillip MD, MD     ASSISTANT:  Anthony Simmons PA-C. First assistant was necessary due to challenging exposure and the need for improved visualization and help maintaining hemostasis.     ESTIMATED BLOOD LOSS:  15 cc's     INDICATIONS:  Kimberly Salinas is a 50 year old male who has had an enlarged thyroid gland with a suspicious nodule on the right.  He has also had symptoms including swallowing difficulties.  He now presents for total thyroidectomy.     DESCRIPTION OF PROCEDURE:  The patient was placed supine, head and neck in extension and a bump between the scapulae.  Transverse cervical neck creases were marked in the preinduction area and the one most suitable was utilized for exposure.  Superior and inferior skin flaps raised.  Midline fascia opened and reflected to the right. The upper pole was taken down by double ligation and division.  Middle thyroidal vein and inferior pole veins were ligated and the gland reflected medially.  The superior parathyroid and recurrent laryngeal nerve were readily seen and preserved.  Nerve conduction was confirmed with nerve monitor.  The posterior dissection was meticulously done to ligate and divide the inferior thyroidal artery and the ligament of Berry.  The inferior parathyroid was seen and preserved.  We then  proceeded with the left thyroid lobectomy.  The upper pole was taken down by double ligation and division.  Middle thyroidal vein and inferior pole veins were ligated and the gland reflected medially.  The superior parathyroid and recurrent laryngeal nerve were readily seen and preserved.  The posterior dissection was meticulously done to ligate and divide the inferior thyroidal artery and the ligament of Berry.  The inferior parathyroid was seen and preserved.  Once the remaining attachments were divided, the gland was oriented for pathology and submitted for permanent sections.  The site was inspected for hemostasis, irrigated and closed using running 3-0 Vicryl for the midline fascia, interrupted for platysma and 4-0 subcuticular Monocryl for skin.  The patient transferred to recovery in good condition.     INTRAOPERATIVE FINDINGS:  1.  Nodular thyroid gland.  2.  Both recurrent laryngeal nerves seen and preserved.  Nerve conduction confirmed with nerve monitor.  3.  Right superior parathyroid, right inferior parathyroid, left superior, and left inferior parathyroid seen and preserved.       Specimens:       ID Type Source Tests Collected by Time Destination   A : TOTAL THYROIDECTOMY, STITCH AT RIGHT SUPERIOR POLE Tissue Thyroid SURGICAL PATHOLOGY EXAM Asad Phillip MD 2/2/2021  1:12 PM           Asad Phillip MD, MD

## 2021-02-25 NOTE — LETTER
"          [facility]    [address] [facility], [address]  [place], [place] [address]  Phone [phone]  Fax [phone] [address], [address]  [place] [address]  Phone [phone]  Fax [phone]    [email or web address]     2021    Re: Kimberly Storm  : 1970    Surgical Pathology    Patient Name: KIMBERLY STORM   MR#: [id number]   Specimen #: [id number]   Collected: 2021   Received: 2021   Reported: 2021 12:48   Ordering Phy(s): ELIZABETH MOTNES     For improved result formatting, select 'View Enhanced Report Format' under    Linked Documents section.     SPECIMEN(S):   Total thyroidectomy     FINAL DIAGNOSIS:   Thyroid gland, total thyroidectomy:   -Benign thyroid tissue with multinodular adenomatous hyperplasia   -No evidence of atypia or malignancy   -No evidence of parathyroid gland or lymph node tissue     Electronically signed out by:     Sylvia Boo M.D.     CLINICAL HISTORY:     Nontoxic multinodular goiter     GROSS:   The specimen is received in formalin with the patient's name and proper   identification labeled \"total   thyroidectomy\".  The specimen consists of 35 g purple nodular total   thyroidectomy (5.8 cm from left to right,   4.5 cm from superior to inferior and 2.8 cm from anterior to posterior).     There is a suture attached marking   the right superior pole.  The tracheal reflection is inked blue and   remaining tissue is inked black.  Upon   serial section there are numerous tan rubbery well-circumscribed nodules   ranging from 0.5 cm up to 0.9 cm   involving the right and left pole.  The right inferior pole has a   well-circumscribed cystic/hemorrhagic nodule   measuring 3.5 x 3.2 x 2.2 cm.  Representative sections are submitted.     Summary of Sections:   A1-A3 - right pole nodules   A4 - isthmus   A5 - left pole nodules (Dictated by: Chuck Quan 2/3/2021 09:13 AM) "     MICROSCOPIC:   A formal microscopic examination has been performed.   Representative slides shared with Dr. Perez who agrees with the   findings and interpretation     The technical component of this testing was completed at the [facility], with the professional component performed    at the [facility], [address] [place] [address]  [phone] ([phone])     CPT Codes:   A: 27871-MB0     COLLECTION SITE:   Client: [facility]   Location: [facility] (S)

## 2021-02-25 NOTE — PROGRESS NOTES
Kimberly is a 50 year old who is being evaluated via a billable video visit.        If the video visit is dropped, the invitation should be resent by: Text to cell phone: [phone]      Video Time: 20 minutes      Subjective   Kimberly is a 50 year old who presents for the following health issues: He is following up from his previous total thyroidectomy    HPI       Patient underwent total thyroidectomy for suspicious thyroid lesions approximately 5 weeks ago.  He is recovering well.  His voice is nearly normal now and he is feeling no signs of hypocalcemia.  He is down to 1 Os-Xu tablet per day.  Mild fatigue but he is not sure if this is related to his thyroid replacement or his decreased exercise regimen.  Continues to have occasional clots in his urine after exercise.    Review of Systems   Constitutional, HEENT, cardiovascular, pulmonary, gi and gu systems are negative, except as otherwise noted.      Objective           Vitals:  No vitals were obtained today due to virtual visit.    Physical Exam   GENERAL: Healthy, alert and no distress  EYES: Eyes grossly normal to inspection.  No discharge or erythema, or obvious scleral/conjunctival abnormalities.  RESP: No audible wheeze, cough, or visible cyanosis.  No visible retractions or increased work of breathing.    SKIN: Visible skin clear. No significant rash, abnormal pigmentation or lesions.  Thyroid incision appears to be healing well.  NEURO: Cranial nerves grossly intact.  Mentation and speech appropriate for age.  PSYCH: Mentation appears normal, affect normal/bright, judgement and insight intact, normal speech and appearance well-groomed.    Assessment and plan: Patient is recovering well from total thyroidectomy.  Fortunately, pathology was benign.  His voice is normal and he is interested in getting back to his previous level of activity.  No signs of Hypocalcemia.  He will follow up with his endocrinologist for finalization of his thyroid dose.         Video-Visit Details    Type of service:  Video Visit      Originating Location (pt. Location): Home    Distant Location (provider location):  [facility]     Platform used for Video Visit: Zoran     Please send Dr. Bonilla a copy of the operative note and the pathology report.

## 2021-03-07 ENCOUNTER — HEALTH MAINTENANCE LETTER (OUTPATIENT)
Age: 51
End: 2021-03-07

## 2021-10-11 ENCOUNTER — HEALTH MAINTENANCE LETTER (OUTPATIENT)
Age: 51
End: 2021-10-11

## 2022-03-27 ENCOUNTER — HEALTH MAINTENANCE LETTER (OUTPATIENT)
Age: 52
End: 2022-03-27

## 2022-09-24 ENCOUNTER — HEALTH MAINTENANCE LETTER (OUTPATIENT)
Age: 52
End: 2022-09-24

## 2023-05-08 ENCOUNTER — HEALTH MAINTENANCE LETTER (OUTPATIENT)
Age: 53
End: 2023-05-08

## 2024-05-11 ENCOUNTER — HEALTH MAINTENANCE LETTER (OUTPATIENT)
Age: 54
End: 2024-05-11

## 2024-11-19 NOTE — OR NURSING
Patient arrived to pacu very restless and trying to climb out of bed. Required several staff members to assist patient remaining in bed. Dr. Stafford at bedside, ordered Versed 2mg IV, med was given.    Subjective   Patient ID: Mira Potter is a 53 y.o. female who presents for Follow-up (Follow up/Discuss trazodone ).  HPI    Here today as a follow up appointment.     Started taking Vitamin D and B12. Missed a few doses, but normally takes well.      Patient is a smoker. Previous XR indicated likely COPD. Smoking about 1 ppd. Has been trying to cut down on her smoking. Now one pack is lasting over a day. Denies any complaints of shortness of breath. Patient states that she has been using the Patches, the Adhesive was causing some irritation. Started Wellbutrin, decreased but continues to smoke.      Previously was in [place], had not drank since end of February. Had been drinking about 6 pack beer/day. States that she has had a few glasses of Wine since last OV. No longer drinking beer daily. Does admit after losing a close friend and family member that she has been drinking more.      Patient has been having increased complaints of her pain. Lower back, legs will go numb intermittent. Bilateral lower extremities and from pain in her lower back down her left leg. Previously followed with Pain Management was told that it was discs in her lower back and recommended Stretching. Fractured Pelvis 5 years ago and completed PT after being in home. Taking OTC Tylenol/Motrin OTC. No improvement with Gabapentin. No improvement with Meloxicam. XR completed in March.      Patient has been taking Zoloft and Wellbutrin, has not been able to take medications consistently. Trazodone as needed for sleep.     Review of Systems   Constitutional:  Negative for activity change, appetite change, chills, fatigue, fever and unexpected weight change.   HENT:  Negative for ear pain, hearing loss, nosebleeds, sore throat, tinnitus and trouble swallowing.    Eyes:  Negative for photophobia, pain and visual disturbance.   Respiratory:  Negative for cough, chest tightness, shortness of breath and wheezing.    Cardiovascular:  Negative  for chest pain, palpitations and leg swelling.   Gastrointestinal:  Negative for abdominal pain, blood in stool, constipation, diarrhea, nausea and vomiting.   Endocrine: Negative for cold intolerance, heat intolerance, polydipsia and polyuria.   Genitourinary:  Negative for dysuria, flank pain and hematuria.   Musculoskeletal:  Negative for arthralgias, back pain, joint swelling, myalgias and neck pain.   Skin:  Negative for pallor, rash and wound.   Allergic/Immunologic: Negative for immunocompromised state.   Neurological:  Negative for dizziness, seizures and headaches.   Hematological:  Does not bruise/bleed easily.   Psychiatric/Behavioral:  Positive for sleep disturbance. Negative for confusion.        Objective   Physical Exam  Vitals and nursing note reviewed.   Constitutional:       General: She is not in acute distress.     Appearance: Normal appearance.   HENT:      Head: Normocephalic.      Nose: Nose normal.   Eyes:      Conjunctiva/sclera: Conjunctivae normal.   Neck:      Vascular: No carotid bruit.   Cardiovascular:      Rate and Rhythm: Normal rate and regular rhythm.      Pulses: Normal pulses.      Heart sounds: Normal heart sounds.   Pulmonary:      Effort: Pulmonary effort is normal.      Breath sounds: Normal breath sounds.   Abdominal:      General: Bowel sounds are normal.      Palpations: Abdomen is soft.   Musculoskeletal:         General: Normal range of motion.      Cervical back: Normal range of motion.   Skin:     General: Skin is warm and dry.   Neurological:      Mental Status: She is alert and oriented to person, place, and time. Mental status is at baseline.   Psychiatric:         Mood and Affect: Mood normal.         Behavior: Behavior normal.       Assessment/Plan         Reviewed most recent records available.      Fatigue, Hypersomnia: Considering Sleep Study.  Declined at this time.   Allergy to Bee Sting: Epipen ordered.   Elevated Liver Enzymes: Repeat lab work ordered.  Consider imaging based on results. ETOH cessation.    Vitamin D Deficiency: Vitamin D. Repeat with next lab work.   Anxiety/Depression: Duloxetine for better pain control. Will continue Wellbutrin and Trazodone.   Vitamin B12 Deficiency: Vitamin B12. Repeat with next lab work.   Nicotine Dependence: I have counselled patient about need for smoking/tobacco cessation and how I can support efforts when patient is ready to quit. Continue Wellbutrin. Currently smoking about 1/2 ppd. 3 minutes were spent counseling the patient on tobacco cessation.  Benefits of cessation were discussed as well as techniques to help quit.    Cough: Albuterol PRN. Smoking Cessation.   Rhinitis: No sign of acute infection. Flonase/Cetirizine as prescribed.   Tinnitus: Audiology referral.   Back Pain, Hip Pain: Declined following back with Pain Management. No improvement with Meloxicam and Gabapentin. PT referral. XR imaging completed in March.    Clavicle Fracture: Following with Orthopedics for management.      Mammogram: Followed with Dr. German. March 2024, last bilateral Mammogram completed.   Cologuard: April 2022, negative.   Unable to update immunizations due to Insurance.   GYN referral.     Sameera Armas, APRN-CNS 11/19/24 10:55 AM

## 2025-04-16 ENCOUNTER — MEDICAL CORRESPONDENCE (OUTPATIENT)
Dept: HEALTH INFORMATION MANAGEMENT | Facility: CLINIC | Age: 55
End: 2025-04-16
Payer: COMMERCIAL

## 2025-04-17 ENCOUNTER — TRANSCRIBE ORDERS (OUTPATIENT)
Dept: OTHER | Age: 55
End: 2025-04-17

## 2025-04-17 DIAGNOSIS — G47.00 INSOMNIA: Primary | ICD-10-CM

## 2025-05-17 ENCOUNTER — HEALTH MAINTENANCE LETTER (OUTPATIENT)
Age: 55
End: 2025-05-17

## 2025-06-06 ENCOUNTER — HOSPITAL ENCOUNTER (EMERGENCY)
Facility: CLINIC | Age: 55
Discharge: HOME | End: 2025-06-06
Attending: EMERGENCY MEDICINE | Admitting: EMERGENCY MEDICINE
Payer: COMMERCIAL

## 2025-06-06 ENCOUNTER — APPOINTMENT (OUTPATIENT)
Dept: CT IMAGING | Facility: CLINIC | Age: 55
End: 2025-06-06
Attending: EMERGENCY MEDICINE
Payer: COMMERCIAL

## 2025-06-06 VITALS
HEART RATE: 68 BPM | TEMPERATURE: 97.7 F | OXYGEN SATURATION: 98 % | DIASTOLIC BLOOD PRESSURE: 98 MMHG | RESPIRATION RATE: 16 BRPM | SYSTOLIC BLOOD PRESSURE: 164 MMHG

## 2025-06-06 DIAGNOSIS — K57.92 DIVERTICULITIS: ICD-10-CM

## 2025-06-06 DIAGNOSIS — K62.5 RECTAL BLEEDING: ICD-10-CM

## 2025-06-06 LAB
ABO + RH BLD: NORMAL
ALBUMIN SERPL BCG-MCNC: 4 G/DL (ref 3.5–5.2)
ALP SERPL-CCNC: 60 U/L (ref 40–150)
ALT SERPL W P-5'-P-CCNC: 30 U/L (ref 0–70)
ANION GAP SERPL CALCULATED.3IONS-SCNC: 9 MMOL/L (ref 7–15)
AST SERPL W P-5'-P-CCNC: 24 U/L (ref 0–45)
BASOPHILS # BLD AUTO: 0.1 10E3/UL (ref 0–0.2)
BASOPHILS NFR BLD AUTO: 0 %
BILIRUB SERPL-MCNC: 0.5 MG/DL
BLD GP AB SCN SERPL QL: NEGATIVE
BUN SERPL-MCNC: 11.2 MG/DL (ref 6–20)
CALCIUM SERPL-MCNC: 8.7 MG/DL (ref 8.8–10.4)
CHLORIDE SERPL-SCNC: 101 MMOL/L (ref 98–107)
CREAT SERPL-MCNC: 0.86 MG/DL (ref 0.67–1.17)
EGFRCR SERPLBLD CKD-EPI 2021: >90 ML/MIN/1.73M2
EOSINOPHIL # BLD AUTO: 0.1 10E3/UL (ref 0–0.7)
EOSINOPHIL NFR BLD AUTO: 1 %
ERYTHROCYTE [DISTWIDTH] IN BLOOD BY AUTOMATED COUNT: 14.1 % (ref 10–15)
GLUCOSE SERPL-MCNC: 93 MG/DL (ref 70–99)
HCO3 SERPL-SCNC: 24 MMOL/L (ref 22–29)
HCT VFR BLD AUTO: 45.7 % (ref 40–53)
HGB BLD-MCNC: 15.9 G/DL (ref 13.3–17.7)
IMM GRANULOCYTES # BLD: 0.1 10E3/UL
IMM GRANULOCYTES NFR BLD: 0 %
LIPASE SERPL-CCNC: 22 U/L (ref 13–60)
LYMPHOCYTES # BLD AUTO: 4.6 10E3/UL (ref 0.8–5.3)
LYMPHOCYTES NFR BLD AUTO: 28 %
MCH RBC QN AUTO: 29.2 PG (ref 26.5–33)
MCHC RBC AUTO-ENTMCNC: 34.8 G/DL (ref 31.5–36.5)
MCV RBC AUTO: 84 FL (ref 78–100)
MONOCYTES # BLD AUTO: 1.5 10E3/UL (ref 0–1.3)
MONOCYTES NFR BLD AUTO: 9 %
NEUTROPHILS # BLD AUTO: 10.3 10E3/UL (ref 1.6–8.3)
NEUTROPHILS NFR BLD AUTO: 62 %
NRBC # BLD AUTO: 0 10E3/UL
NRBC BLD AUTO-RTO: 0 /100
PLATELET # BLD AUTO: 215 10E3/UL (ref 150–450)
POTASSIUM SERPL-SCNC: 4.1 MMOL/L (ref 3.4–5.3)
PROT SERPL-MCNC: 6.6 G/DL (ref 6.4–8.3)
RBC # BLD AUTO: 5.45 10E6/UL (ref 4.4–5.9)
SODIUM SERPL-SCNC: 134 MMOL/L (ref 135–145)
SPECIMEN EXP DATE BLD: NORMAL
WBC # BLD AUTO: 16.6 10E3/UL (ref 4–11)

## 2025-06-06 PROCEDURE — 250N000013 HC RX MED GY IP 250 OP 250 PS 637: Performed by: EMERGENCY MEDICINE

## 2025-06-06 PROCEDURE — 250N000011 HC RX IP 250 OP 636: Performed by: EMERGENCY MEDICINE

## 2025-06-06 PROCEDURE — 250N000009 HC RX 250: Performed by: EMERGENCY MEDICINE

## 2025-06-06 PROCEDURE — 85025 COMPLETE CBC W/AUTO DIFF WBC: CPT | Performed by: EMERGENCY MEDICINE

## 2025-06-06 PROCEDURE — 74177 CT ABD & PELVIS W/CONTRAST: CPT

## 2025-06-06 PROCEDURE — 86901 BLOOD TYPING SEROLOGIC RH(D): CPT | Performed by: EMERGENCY MEDICINE

## 2025-06-06 PROCEDURE — 36415 COLL VENOUS BLD VENIPUNCTURE: CPT | Performed by: EMERGENCY MEDICINE

## 2025-06-06 PROCEDURE — 80053 COMPREHEN METABOLIC PANEL: CPT | Performed by: EMERGENCY MEDICINE

## 2025-06-06 PROCEDURE — 99285 EMERGENCY DEPT VISIT HI MDM: CPT | Mod: 25

## 2025-06-06 PROCEDURE — 83690 ASSAY OF LIPASE: CPT | Performed by: EMERGENCY MEDICINE

## 2025-06-06 RX ORDER — CIPROFLOXACIN 500 MG/1
500 TABLET, FILM COATED ORAL 2 TIMES DAILY
Qty: 10 TABLET | Refills: 0 | Status: SHIPPED | OUTPATIENT
Start: 2025-06-06 | End: 2025-06-11

## 2025-06-06 RX ORDER — IOPAMIDOL 755 MG/ML
135 INJECTION, SOLUTION INTRAVASCULAR ONCE
Status: COMPLETED | OUTPATIENT
Start: 2025-06-06 | End: 2025-06-06

## 2025-06-06 RX ORDER — METRONIDAZOLE 500 MG/1
500 TABLET ORAL 3 TIMES DAILY
Qty: 15 TABLET | Refills: 0 | Status: SHIPPED | OUTPATIENT
Start: 2025-06-06 | End: 2025-06-11

## 2025-06-06 RX ORDER — METRONIDAZOLE 500 MG/1
500 TABLET ORAL ONCE
Status: COMPLETED | OUTPATIENT
Start: 2025-06-06 | End: 2025-06-06

## 2025-06-06 RX ORDER — CIPROFLOXACIN 500 MG/1
500 TABLET, FILM COATED ORAL ONCE
Status: COMPLETED | OUTPATIENT
Start: 2025-06-06 | End: 2025-06-06

## 2025-06-06 RX ADMIN — IOPAMIDOL 135 ML: 755 INJECTION, SOLUTION INTRAVENOUS at 13:46

## 2025-06-06 RX ADMIN — CIPROFLOXACIN HYDROCHLORIDE 500 MG: 500 TABLET, FILM COATED ORAL at 15:15

## 2025-06-06 RX ADMIN — METRONIDAZOLE 500 MG: 500 TABLET ORAL at 15:15

## 2025-06-06 RX ADMIN — SODIUM CHLORIDE 79 ML: 9 INJECTION, SOLUTION INTRAVENOUS at 13:46

## 2025-06-06 ASSESSMENT — ACTIVITIES OF DAILY LIVING (ADL)
ADLS_ACUITY_SCORE: 42

## 2025-06-06 NOTE — ED PROVIDER NOTES
Emergency Department Note      History of Present Illness     Chief Complaint   Abdominal Pain and Rectal Bleeding      HPI   Kimberly Salinas is a 55 year old male who presents to the ED with abdominal pain and rectal bleeding. Patient reports he woke up at 0600 this morning with lower abdominal discomfort and mild pain. When he went to the bathroom he had significant bright red blood in his stool, he estimates about 2 tablespoons of blood. His first bowel movement was solid then he had two more and his last one was loose. He called his GP with worsening pain so they referred him to the ER. He denies fever, vomiting, or dysuria.     Independent Historian   None    Review of External Notes   Reviewed patient's general surgery virtual visit, this was a follow-up to total thyroidectomy.  Recovering well.  No signs of hypocalcemia.    Past Medical History     Medical History and Problem List   Multinodular goiter    Medications   Cozaar   Robaxin   Synthroid       Surgical History   Neck lumpectomy   Open reduction internal fixation left wrist   Thyroidectomy     Physical Exam     Patient Vitals for the past 24 hrs:   BP Temp Pulse Resp SpO2   06/06/25 1144 (!) 164/98 97.7  F (36.5  C) 68 16 98 %     Physical Exam  General: Well-nourished, resting comfortably when I enter the room  Eyes: Pupils equal, conjunctivae pink no scleral icterus or conjunctival injection  ENT:  Moist mucus membranes  Respiratory:  Lungs clear to auscultation bilaterally, no crackles/rubs/wheezes.  Good air movement  CV: Normal rate and rhythm, no murmurs  GI:  Abdomen soft and non-distended.  No guarding or rebound.  Mild tenderness to palpation in the bilateral lower quadrants.  Skin: Warm, dry.  No rashes or petechiae  Musculoskeletal: No peripheral edema or calf tenderness  Neuro: Alert and oriented to person/place/time  Psychiatric: Normal affect     Diagnostics     Lab Results   Labs Ordered and Resulted from Time of ED Arrival to Time  of ED Departure   COMPREHENSIVE METABOLIC PANEL - Abnormal       Result Value    Sodium 134 (*)     Potassium 4.1      Carbon Dioxide (CO2) 24      Anion Gap 9      Urea Nitrogen 11.2      Creatinine 0.86      GFR Estimate >90      Calcium 8.7 (*)     Chloride 101      Glucose 93      Alkaline Phosphatase 60      AST 24      ALT 30      Protein Total 6.6      Albumin 4.0      Bilirubin Total 0.5     CBC WITH PLATELETS AND DIFFERENTIAL - Abnormal    WBC Count 16.6 (*)     RBC Count 5.45      Hemoglobin 15.9      Hematocrit 45.7      MCV 84      MCH 29.2      MCHC 34.8      RDW 14.1      Platelet Count 215      % Neutrophils 62      % Lymphocytes 28      % Monocytes 9      % Eosinophils 1      % Basophils 0      % Immature Granulocytes 0      NRBCs per 100 WBC 0      Absolute Neutrophils 10.3 (*)     Absolute Lymphocytes 4.6      Absolute Monocytes 1.5 (*)     Absolute Eosinophils 0.1      Absolute Basophils 0.1      Absolute Immature Granulocytes 0.1      Absolute NRBCs 0.0     LIPASE - Normal    Lipase 22     TYPE AND SCREEN, ADULT    ABO/RH(D) A POS      Antibody Screen Negative      SPECIMEN EXPIRATION DATE 6/9/2025 11:59:00 PM CDT     ABO/RH TYPE AND SCREEN       Imaging   CT Abdomen Pelvis w Contrast   Final Result   IMPRESSION:   1.  Acute diverticulitis involving the lower descending colon. No perforation or abscess.               Independent Interpretation   None    ED Course      Medications Administered   Medications   iopamidol (ISOVUE-370) solution 135 mL (135 mLs Intravenous $Given 6/6/25 1346)   sodium chloride 0.9 % bag for CT scan flush (79 mLs Intravenous $Given 6/6/25 1346)   ciprofloxacin (CIPRO) tablet 500 mg (500 mg Oral $Given 6/6/25 1515)   metroNIDAZOLE (FLAGYL) tablet 500 mg (500 mg Oral $Given 6/6/25 1515)       Procedures   Procedures     Discussion of Management   None    ED Course   ED Course as of 06/06/25 1536   Fri Jun 06, 2025   1235 I obtained history and examined the patient as  noted above.        Additional Documentation  None    Medical Decision Making / Diagnosis     CMS Diagnoses: None    MIPS   None     MDM   Kimberly Salinas is a 55 year old male presents to the emergency department with a complaint of rectal bleeding and bilateral lower abdominal pain.  On exam patient is not in any acute distress.  Vital signs are all within acceptable limits.  Abdomen is soft.  There is mild tenderness in his bilateral lower abdomen without any peritoneal signs.  Workup reveals a slight leukocytosis of 16.6.  CT scan shows uncomplicated diverticulitis.  No sign of abscess or perforation.  Hemoglobin is stable and I do not think that he needs a blood transfusion or admission to the hospital.  Patient is given a prescription for antibiotics, he does not want to stay in the ED for a dose here.  He is discharged home to follow-up with primary care.    Disposition   The patient was discharged.     Diagnosis     ICD-10-CM    1. Diverticulitis  K57.92       2. Rectal bleeding  K62.5            Discharge Medications   Discharge Medication List as of 6/6/2025  2:59 PM        START taking these medications    Details   ciprofloxacin (CIPRO) 500 MG tablet Take 1 tablet (500 mg) by mouth 2 times daily for 5 days., Disp-10 tablet, R-0, E-Prescribe      metroNIDAZOLE (FLAGYL) 500 MG tablet Take 1 tablet (500 mg) by mouth 3 times daily for 5 days., Disp-15 tablet, R-0, E-PrescribeEat yogurt or cottage cheese daily to prevent diarrhea that can be caused by taking this medication.               Scribe Disclosure:  I, Harinder Montez, am serving as a scribe at 12:34 PM on 6/6/2025 to document services personally performed by Sylvia Gray MD based on my observations and the provider's statements to me.        Sylvia Gray MD  06/06/25 5503

## 2025-06-06 NOTE — ED TRIAGE NOTES
Pt woke up this morning with abd pain and when went to the BR noticed blood in the stool pt had a few more episodes of BM with blood in the stool     Reports it was bright red blood     Pmh of anal fissures     Pt still reports continued abd LLQ rated at 5/10

## 2025-06-19 ENCOUNTER — HOSPITAL ENCOUNTER (INPATIENT)
Facility: CLINIC | Age: 55
End: 2025-06-19
Attending: BEHAVIOR TECHNICIAN | Admitting: STUDENT IN AN ORGANIZED HEALTH CARE EDUCATION/TRAINING PROGRAM
Payer: COMMERCIAL

## 2025-06-19 ENCOUNTER — APPOINTMENT (OUTPATIENT)
Dept: CT IMAGING | Facility: CLINIC | Age: 55
End: 2025-06-19
Attending: BEHAVIOR TECHNICIAN
Payer: COMMERCIAL

## 2025-06-19 VITALS
TEMPERATURE: 97.6 F | DIASTOLIC BLOOD PRESSURE: 101 MMHG | HEART RATE: 51 BPM | OXYGEN SATURATION: 100 % | RESPIRATION RATE: 16 BRPM | SYSTOLIC BLOOD PRESSURE: 155 MMHG

## 2025-06-19 DIAGNOSIS — K62.5 RECTAL BLEEDING: ICD-10-CM

## 2025-06-19 DIAGNOSIS — K57.92 ACUTE DIVERTICULITIS: ICD-10-CM

## 2025-06-19 PROBLEM — I10 HTN (HYPERTENSION): Status: ACTIVE | Noted: 2025-06-19

## 2025-06-19 PROBLEM — E04.2 NONTOXIC MULTINODULAR GOITER: Status: RESOLVED | Noted: 2021-01-05 | Resolved: 2025-06-19

## 2025-06-19 PROBLEM — F41.9 ANXIETY: Status: ACTIVE | Noted: 2025-06-19

## 2025-06-19 PROBLEM — G47.00 INSOMNIA: Status: ACTIVE | Noted: 2025-06-19

## 2025-06-19 PROBLEM — E04.2 MULTINODULAR GOITER: Status: RESOLVED | Noted: 2021-02-02 | Resolved: 2025-06-19

## 2025-06-19 LAB
ALBUMIN SERPL BCG-MCNC: 4.3 G/DL (ref 3.5–5.2)
ALP SERPL-CCNC: 50 U/L (ref 40–150)
ALT SERPL W P-5'-P-CCNC: 44 U/L (ref 0–70)
ANION GAP SERPL CALCULATED.3IONS-SCNC: 10 MMOL/L (ref 7–15)
AST SERPL W P-5'-P-CCNC: 30 U/L (ref 0–45)
BASE EXCESS BLDV CALC-SCNC: 4 MMOL/L (ref -3–3)
BASOPHILS # BLD MANUAL: 0 10E3/UL (ref 0–0.2)
BASOPHILS NFR BLD MANUAL: 0 %
BILIRUB SERPL-MCNC: 0.6 MG/DL
BUN SERPL-MCNC: 11.7 MG/DL (ref 6–20)
CALCIUM SERPL-MCNC: 9 MG/DL (ref 8.8–10.4)
CHLORIDE SERPL-SCNC: 102 MMOL/L (ref 98–107)
CREAT SERPL-MCNC: 0.94 MG/DL (ref 0.67–1.17)
EGFRCR SERPLBLD CKD-EPI 2021: >90 ML/MIN/1.73M2
EOSINOPHIL # BLD MANUAL: 0.5 10E3/UL (ref 0–0.7)
EOSINOPHIL NFR BLD MANUAL: 3 %
ERYTHROCYTE [DISTWIDTH] IN BLOOD BY AUTOMATED COUNT: 14 % (ref 10–15)
GLUCOSE SERPL-MCNC: 88 MG/DL (ref 70–99)
HCO3 BLDV-SCNC: 30 MMOL/L (ref 21–28)
HCO3 SERPL-SCNC: 28 MMOL/L (ref 22–29)
HCT VFR BLD AUTO: 48 % (ref 40–53)
HGB BLD-MCNC: 16.2 G/DL (ref 13.3–17.7)
HOLD SPECIMEN: NORMAL
HOLD SPECIMEN: NORMAL
LACTATE BLD-SCNC: 0.8 MMOL/L (ref 0.7–2)
LYMPHOCYTES # BLD MANUAL: 7.1 10E3/UL (ref 0.8–5.3)
LYMPHOCYTES NFR BLD MANUAL: 47 %
MCH RBC QN AUTO: 28.9 PG (ref 26.5–33)
MCHC RBC AUTO-ENTMCNC: 33.8 G/DL (ref 31.5–36.5)
MCV RBC AUTO: 86 FL (ref 78–100)
MONOCYTES # BLD MANUAL: 1.4 10E3/UL (ref 0–1.3)
MONOCYTES NFR BLD MANUAL: 9 %
MYELOCYTES # BLD MANUAL: 0.2 10E3/UL
MYELOCYTES NFR BLD MANUAL: 1 %
NEUTROPHILS # BLD MANUAL: 6 10E3/UL (ref 1.6–8.3)
NEUTROPHILS NFR BLD MANUAL: 40 %
PCO2 BLDV: 50 MM HG (ref 40–50)
PH BLDV: 7.39 [PH] (ref 7.32–7.43)
PLAT MORPH BLD: NORMAL
PLATELET # BLD AUTO: 257 10E3/UL (ref 150–450)
PO2 BLDV: 22 MM HG (ref 25–47)
POTASSIUM SERPL-SCNC: 4.1 MMOL/L (ref 3.4–5.3)
PROT SERPL-MCNC: 6.9 G/DL (ref 6.4–8.3)
RBC # BLD AUTO: 5.61 10E6/UL (ref 4.4–5.9)
RBC MORPH BLD: NORMAL
SAO2 % BLDV: 34 % (ref 70–75)
SODIUM SERPL-SCNC: 140 MMOL/L (ref 135–145)
WBC # BLD AUTO: 15 10E3/UL (ref 4–11)

## 2025-06-19 PROCEDURE — 74177 CT ABD & PELVIS W/CONTRAST: CPT

## 2025-06-19 PROCEDURE — 99285 EMERGENCY DEPT VISIT HI MDM: CPT | Mod: 25

## 2025-06-19 PROCEDURE — 250N000011 HC RX IP 250 OP 636: Performed by: BEHAVIOR TECHNICIAN

## 2025-06-19 PROCEDURE — 87040 BLOOD CULTURE FOR BACTERIA: CPT | Performed by: BEHAVIOR TECHNICIAN

## 2025-06-19 PROCEDURE — 36415 COLL VENOUS BLD VENIPUNCTURE: CPT | Performed by: BEHAVIOR TECHNICIAN

## 2025-06-19 PROCEDURE — 80053 COMPREHEN METABOLIC PANEL: CPT | Performed by: BEHAVIOR TECHNICIAN

## 2025-06-19 PROCEDURE — 99222 1ST HOSP IP/OBS MODERATE 55: CPT | Performed by: STUDENT IN AN ORGANIZED HEALTH CARE EDUCATION/TRAINING PROGRAM

## 2025-06-19 PROCEDURE — 82803 BLOOD GASES ANY COMBINATION: CPT

## 2025-06-19 PROCEDURE — 120N000001 HC R&B MED SURG/OB

## 2025-06-19 PROCEDURE — 250N000009 HC RX 250: Performed by: BEHAVIOR TECHNICIAN

## 2025-06-19 PROCEDURE — 85014 HEMATOCRIT: CPT | Performed by: BEHAVIOR TECHNICIAN

## 2025-06-19 PROCEDURE — 85007 BL SMEAR W/DIFF WBC COUNT: CPT | Performed by: BEHAVIOR TECHNICIAN

## 2025-06-19 RX ORDER — CIPROFLOXACIN 2 MG/ML
400 INJECTION, SOLUTION INTRAVENOUS ONCE
Status: COMPLETED | OUTPATIENT
Start: 2025-06-19 | End: 2025-06-20

## 2025-06-19 RX ORDER — IOPAMIDOL 755 MG/ML
135 INJECTION, SOLUTION INTRAVASCULAR ONCE
Status: COMPLETED | OUTPATIENT
Start: 2025-06-19 | End: 2025-06-19

## 2025-06-19 RX ORDER — METRONIDAZOLE 500 MG/100ML
500 INJECTION, SOLUTION INTRAVENOUS ONCE
Status: COMPLETED | OUTPATIENT
Start: 2025-06-19 | End: 2025-06-20

## 2025-06-19 RX ADMIN — IOPAMIDOL 135 ML: 755 INJECTION, SOLUTION INTRAVENOUS at 22:10

## 2025-06-19 RX ADMIN — SODIUM CHLORIDE 79 ML: 9 INJECTION, SOLUTION INTRAVENOUS at 22:10

## 2025-06-19 RX ADMIN — CIPROFLOXACIN 400 MG: 400 INJECTION, SOLUTION INTRAVENOUS at 23:37

## 2025-06-19 ASSESSMENT — ACTIVITIES OF DAILY LIVING (ADL)
ADLS_ACUITY_SCORE: 42

## 2025-06-19 ASSESSMENT — COLUMBIA-SUICIDE SEVERITY RATING SCALE - C-SSRS
1. IN THE PAST MONTH, HAVE YOU WISHED YOU WERE DEAD OR WISHED YOU COULD GO TO SLEEP AND NOT WAKE UP?: NO
6. HAVE YOU EVER DONE ANYTHING, STARTED TO DO ANYTHING, OR PREPARED TO DO ANYTHING TO END YOUR LIFE?: NO
2. HAVE YOU ACTUALLY HAD ANY THOUGHTS OF KILLING YOURSELF IN THE PAST MONTH?: NO

## 2025-06-19 NOTE — ED TRIAGE NOTES
Pt was seen 2 weeks ago for diverticulitis and rectal bleeding. Has been doing good, increasing fiber in diet and getting back to routine. Today, went to bathroom and had bright red blood per rectum again and told to return. Some pain     Triage Assessment (Adult)       Row Name 06/19/25 1446          Triage Assessment    Airway WDL WDL        Respiratory WDL    Respiratory WDL WDL        Skin Circulation/Temperature WDL    Skin Circulation/Temperature WDL WDL        Cardiac WDL    Cardiac WDL WDL        Peripheral/Neurovascular WDL    Peripheral Neurovascular WDL WDL        Cognitive/Neuro/Behavioral WDL    Cognitive/Neuro/Behavioral WDL WDL

## 2025-06-20 LAB
ALBUMIN SERPL BCG-MCNC: 3.8 G/DL (ref 3.5–5.2)
ALP SERPL-CCNC: 46 U/L (ref 40–150)
ALT SERPL W P-5'-P-CCNC: 41 U/L (ref 0–70)
ANION GAP SERPL CALCULATED.3IONS-SCNC: 11 MMOL/L (ref 7–15)
AST SERPL W P-5'-P-CCNC: 29 U/L (ref 0–45)
BILIRUB SERPL-MCNC: 0.7 MG/DL
BUN SERPL-MCNC: 10.9 MG/DL (ref 6–20)
CALCIUM SERPL-MCNC: 8.5 MG/DL (ref 8.8–10.4)
CHLORIDE SERPL-SCNC: 102 MMOL/L (ref 98–107)
CREAT SERPL-MCNC: 0.89 MG/DL (ref 0.67–1.17)
EGFRCR SERPLBLD CKD-EPI 2021: >90 ML/MIN/1.73M2
ERYTHROCYTE [DISTWIDTH] IN BLOOD BY AUTOMATED COUNT: 14.2 % (ref 10–15)
GLUCOSE SERPL-MCNC: 96 MG/DL (ref 70–99)
HCO3 SERPL-SCNC: 25 MMOL/L (ref 22–29)
HCT VFR BLD AUTO: 45.6 % (ref 40–53)
HGB BLD-MCNC: 15.5 G/DL (ref 13.3–17.7)
MCH RBC QN AUTO: 29 PG (ref 26.5–33)
MCHC RBC AUTO-ENTMCNC: 34 G/DL (ref 31.5–36.5)
MCV RBC AUTO: 85 FL (ref 78–100)
PLATELET # BLD AUTO: 225 10E3/UL (ref 150–450)
POTASSIUM SERPL-SCNC: 4 MMOL/L (ref 3.4–5.3)
PROT SERPL-MCNC: 6.1 G/DL (ref 6.4–8.3)
RBC # BLD AUTO: 5.34 10E6/UL (ref 4.4–5.9)
SODIUM SERPL-SCNC: 138 MMOL/L (ref 135–145)
WBC # BLD AUTO: 8.9 10E3/UL (ref 4–11)

## 2025-06-20 PROCEDURE — 99232 SBSQ HOSP IP/OBS MODERATE 35: CPT | Performed by: INTERNAL MEDICINE

## 2025-06-20 PROCEDURE — 99207 PR APP CREDIT; MD BILLING SHARED VISIT: CPT | Mod: FS

## 2025-06-20 PROCEDURE — 250N000011 HC RX IP 250 OP 636: Performed by: BEHAVIOR TECHNICIAN

## 2025-06-20 PROCEDURE — 120N000001 HC R&B MED SURG/OB

## 2025-06-20 PROCEDURE — 99254 IP/OBS CNSLTJ NEW/EST MOD 60: CPT | Mod: FS | Performed by: COLON & RECTAL SURGERY

## 2025-06-20 PROCEDURE — 250N000011 HC RX IP 250 OP 636: Performed by: STUDENT IN AN ORGANIZED HEALTH CARE EDUCATION/TRAINING PROGRAM

## 2025-06-20 PROCEDURE — 250N000013 HC RX MED GY IP 250 OP 250 PS 637: Performed by: STUDENT IN AN ORGANIZED HEALTH CARE EDUCATION/TRAINING PROGRAM

## 2025-06-20 PROCEDURE — 258N000003 HC RX IP 258 OP 636: Performed by: STUDENT IN AN ORGANIZED HEALTH CARE EDUCATION/TRAINING PROGRAM

## 2025-06-20 PROCEDURE — 82310 ASSAY OF CALCIUM: CPT | Performed by: STUDENT IN AN ORGANIZED HEALTH CARE EDUCATION/TRAINING PROGRAM

## 2025-06-20 PROCEDURE — 258N000003 HC RX IP 258 OP 636: Performed by: INTERNAL MEDICINE

## 2025-06-20 PROCEDURE — 85014 HEMATOCRIT: CPT | Performed by: STUDENT IN AN ORGANIZED HEALTH CARE EDUCATION/TRAINING PROGRAM

## 2025-06-20 PROCEDURE — 36415 COLL VENOUS BLD VENIPUNCTURE: CPT | Performed by: STUDENT IN AN ORGANIZED HEALTH CARE EDUCATION/TRAINING PROGRAM

## 2025-06-20 RX ORDER — NALOXONE HYDROCHLORIDE 0.4 MG/ML
0.4 INJECTION, SOLUTION INTRAMUSCULAR; INTRAVENOUS; SUBCUTANEOUS
Status: DISCONTINUED | OUTPATIENT
Start: 2025-06-20 | End: 2025-06-21 | Stop reason: HOSPADM

## 2025-06-20 RX ORDER — ONDANSETRON 4 MG/1
4 TABLET, ORALLY DISINTEGRATING ORAL EVERY 6 HOURS PRN
Status: DISCONTINUED | OUTPATIENT
Start: 2025-06-20 | End: 2025-06-21 | Stop reason: HOSPADM

## 2025-06-20 RX ORDER — LEVOTHYROXINE SODIUM 100 UG/1
200 TABLET ORAL
Status: DISCONTINUED | OUTPATIENT
Start: 2025-06-20 | End: 2025-06-21 | Stop reason: HOSPADM

## 2025-06-20 RX ORDER — LOSARTAN POTASSIUM 100 MG/1
100 TABLET ORAL EVERY EVENING
COMMUNITY

## 2025-06-20 RX ORDER — NALOXONE HYDROCHLORIDE 0.4 MG/ML
0.2 INJECTION, SOLUTION INTRAMUSCULAR; INTRAVENOUS; SUBCUTANEOUS
Status: DISCONTINUED | OUTPATIENT
Start: 2025-06-20 | End: 2025-06-21 | Stop reason: HOSPADM

## 2025-06-20 RX ORDER — ONDANSETRON 2 MG/ML
4 INJECTION INTRAMUSCULAR; INTRAVENOUS EVERY 6 HOURS PRN
Status: DISCONTINUED | OUTPATIENT
Start: 2025-06-20 | End: 2025-06-21 | Stop reason: HOSPADM

## 2025-06-20 RX ORDER — METRONIDAZOLE 500 MG/100ML
500 INJECTION, SOLUTION INTRAVENOUS EVERY 12 HOURS
Status: DISCONTINUED | OUTPATIENT
Start: 2025-06-20 | End: 2025-06-21 | Stop reason: HOSPADM

## 2025-06-20 RX ORDER — LIDOCAINE 40 MG/G
CREAM TOPICAL
Status: DISCONTINUED | OUTPATIENT
Start: 2025-06-20 | End: 2025-06-21 | Stop reason: HOSPADM

## 2025-06-20 RX ORDER — HYDROMORPHONE HYDROCHLORIDE 1 MG/ML
0.5 INJECTION, SOLUTION INTRAMUSCULAR; INTRAVENOUS; SUBCUTANEOUS EVERY 4 HOURS PRN
Status: DISCONTINUED | OUTPATIENT
Start: 2025-06-20 | End: 2025-06-21 | Stop reason: HOSPADM

## 2025-06-20 RX ORDER — SODIUM CHLORIDE 9 MG/ML
INJECTION, SOLUTION INTRAVENOUS CONTINUOUS
Status: ACTIVE | OUTPATIENT
Start: 2025-06-20 | End: 2025-06-20

## 2025-06-20 RX ORDER — AMOXICILLIN 250 MG
1 CAPSULE ORAL 2 TIMES DAILY PRN
Status: DISCONTINUED | OUTPATIENT
Start: 2025-06-20 | End: 2025-06-21 | Stop reason: HOSPADM

## 2025-06-20 RX ORDER — PROCHLORPERAZINE MALEATE 10 MG
10 TABLET ORAL EVERY 6 HOURS PRN
Status: DISCONTINUED | OUTPATIENT
Start: 2025-06-20 | End: 2025-06-21 | Stop reason: HOSPADM

## 2025-06-20 RX ORDER — OXYCODONE HYDROCHLORIDE 5 MG/1
5 TABLET ORAL EVERY 4 HOURS PRN
Status: DISCONTINUED | OUTPATIENT
Start: 2025-06-20 | End: 2025-06-21 | Stop reason: HOSPADM

## 2025-06-20 RX ORDER — OXYCODONE HYDROCHLORIDE 5 MG/1
10 TABLET ORAL EVERY 4 HOURS PRN
Status: DISCONTINUED | OUTPATIENT
Start: 2025-06-20 | End: 2025-06-21 | Stop reason: HOSPADM

## 2025-06-20 RX ORDER — LOSARTAN POTASSIUM 25 MG/1
100 TABLET ORAL ONCE
Status: COMPLETED | OUTPATIENT
Start: 2025-06-20 | End: 2025-06-20

## 2025-06-20 RX ORDER — CIPROFLOXACIN 2 MG/ML
400 INJECTION, SOLUTION INTRAVENOUS EVERY 12 HOURS
Status: DISCONTINUED | OUTPATIENT
Start: 2025-06-20 | End: 2025-06-21 | Stop reason: HOSPADM

## 2025-06-20 RX ORDER — AMOXICILLIN 250 MG
2 CAPSULE ORAL 2 TIMES DAILY PRN
Status: DISCONTINUED | OUTPATIENT
Start: 2025-06-20 | End: 2025-06-21 | Stop reason: HOSPADM

## 2025-06-20 RX ORDER — SODIUM CHLORIDE 9 MG/ML
INJECTION, SOLUTION INTRAVENOUS CONTINUOUS
Status: DISCONTINUED | OUTPATIENT
Start: 2025-06-20 | End: 2025-06-20

## 2025-06-20 RX ORDER — CALCIUM CARBONATE 500 MG/1
1000 TABLET, CHEWABLE ORAL 4 TIMES DAILY PRN
Status: DISCONTINUED | OUTPATIENT
Start: 2025-06-20 | End: 2025-06-21 | Stop reason: HOSPADM

## 2025-06-20 RX ORDER — CHOLECALCIFEROL (VITAMIN D3) 50 MCG
1 TABLET ORAL DAILY
COMMUNITY

## 2025-06-20 RX ADMIN — LOSARTAN POTASSIUM 100 MG: 25 TABLET, FILM COATED ORAL at 01:28

## 2025-06-20 RX ADMIN — CIPROFLOXACIN 400 MG: 2 INJECTION, SOLUTION INTRAVENOUS at 12:00

## 2025-06-20 RX ADMIN — METRONIDAZOLE 500 MG: 500 INJECTION, SOLUTION INTRAVENOUS at 13:10

## 2025-06-20 RX ADMIN — SODIUM CHLORIDE: 0.9 INJECTION, SOLUTION INTRAVENOUS at 13:10

## 2025-06-20 RX ADMIN — METRONIDAZOLE 500 MG: 500 INJECTION, SOLUTION INTRAVENOUS at 00:38

## 2025-06-20 RX ADMIN — SODIUM CHLORIDE: 0.9 INJECTION, SOLUTION INTRAVENOUS at 03:21

## 2025-06-20 RX ADMIN — SODIUM CHLORIDE: 0.9 INJECTION, SOLUTION INTRAVENOUS at 17:15

## 2025-06-20 RX ADMIN — LEVOTHYROXINE SODIUM 200 MCG: 100 TABLET ORAL at 06:56

## 2025-06-20 ASSESSMENT — ACTIVITIES OF DAILY LIVING (ADL)
ADLS_ACUITY_SCORE: 19
ADLS_ACUITY_SCORE: 19
ADLS_ACUITY_SCORE: 42
ADLS_ACUITY_SCORE: 19
ADLS_ACUITY_SCORE: 42
ADLS_ACUITY_SCORE: 19
ADLS_ACUITY_SCORE: 42
ADLS_ACUITY_SCORE: 19

## 2025-06-20 NOTE — PROGRESS NOTES
[facility]    Hospitalist Progress Note    Assessment & Plan   Date of Admission:  6/19/2025     Assessment & Plan  Kimberly Salinas is a 55 year old male admitted on 6/19/2025. He presents with rectal bleeding.        Acute diverticulitis    Rectal bleeding    Assessment:  pt was recently diagnosed with diverticulitis after rectal bleeding on 6/6 and recetly finished five days of antibiotics and reports he was feeling better. Today, started having rectal bleeding again at 1330.  Denies abdominal pain, nausea or vomiting. Hgb stable at 16.2, WBC 15.0 otherwise HD stable. No fevers or chills. Overall non septic appearing. CT ABDOMEN shows Mild acute, uncomplicated diverticulitis of the descending colon.     6/20-clinically doing well.  Feeling better.  Vital signs normal.  No further bleeding.  Abdominal pain resolved.  Tolerating clear liquids.  Afebrile.  Normal LFTs BMP.  White blood cell count normalized.  -Seen by colorectal surgery convert to p.o. antibiotics anticipate discharge home in the next 1 to 2 days.      Plan:   -Admit to inpatient  -CRS eval  -Advance to low fiber diet.  -IV Cipro and Flagy transition to oral antibiotics tomorrow  -IVF another 5 hours then saline lock his p.o. intake has been adequate  -Pain control as needed  -Follow vitals/temp  - Labs as needed  -Will need interval colonoscopy in 6 to 8 weeks.  -Follow-up with colorectal surgery outpatient       HTN (hypertension)    Assessment/Plan: not on anti-HTNs       Hypothyroidism    Assessment/Plan: continue PTA Synthroid       Insomnia     Anxiety    Assessment/Plan: stable, sleep aid as needed        Diet:  Clear Liquid Diet  DVT Prophylaxis: Pneumatic Compression Devices  Weiss Catheter: Not present  Lines: None     Cardiac Monitoring: None  Code Status:  FULL CODE    DVT Prophylaxis: Pneumatic Compression Devices  Code Status: Full Code    Disposition-Home  Medically Ready for Discharge: Anticipated  "Tomorrow    Clinically Significant Risk Factors Present on Admission           # Hypocalcemia: Lowest Ca = 8.5 mg/dL in last 2 days, will monitor and replace as appropriate         # Hypertension: Noted on problem list         # Severe Obesity: Estimated body mass index is 35.14 kg/m  as calculated from the following:    Height as of this encounter: 1.854 m (6' 1\").    Weight as of this encounter: 120.8 kg (266 lb 5.1 oz). with complications              Moderate complex medical decision may making.  Greater than 35 minutes spent on patient care including charting, chart review, exam, , discharge preparation    Vivek Everett MD, MD  Text Page  (7am to 6pm)  Interval History   Patient seen earlier.  No further bleeding.  No abdominal pain.  Tolerating clear liquids.  By colorectal surgery this afternoon and diet advanced.    -Data reviewed today: I reviewed all new labs and imaging results over the last 24 hours. I personally reviewed imaging and labs since admission    Physical Exam   Temp: 98.4  F (36.9  C) Temp src: Oral BP: (!) 147/84 Pulse: 53   Resp: 16 SpO2: 96 % O2 Device: None (Room air)    Vitals:    06/20/25 0311   Weight: 120.8 kg (266 lb 5.1 oz)     Vital Signs with Ranges  Temp:  [97.3  F (36.3  C)-98.4  F (36.9  C)] 98.4  F (36.9  C)  Pulse:  [48-55] 53  Resp:  [16] 16  BP: (142-155)/() 147/84  SpO2:  [96 %-100 %] 96 %  I/O last 3 completed shifts:  In: 700 [P.O.:500; IV Piggyback:200]  Out: -     Constitutional: Up in bed, no acute distress  Respiratory: Clear to auscultation bilaterally  Cardiovascular: Regular rate and rhythm no rubs gallops or murmurs appreciate  GI: Soft nontender nondistended  Skin/Integumen: No rash or edema  Neurologic-speech mentation  Psychiatric, normal affect      Medications   Current Facility-Administered Medications   Medication Dose Route Frequency Provider Last Rate Last Admin    sodium chloride 0.9 % infusion   Intravenous Continuous Karlos, " Vivek SHARPE MD         Current Facility-Administered Medications   Medication Dose Route Frequency Provider Last Rate Last Admin    ciprofloxacin (CIPRO) infusion 400 mg  400 mg Intravenous Q12H Juice Hess  mL/hr at 06/20/25 1200 400 mg at 06/20/25 1200    levothyroxine (SYNTHROID/LEVOTHROID) tablet 200 mcg  200 mcg Oral QAM AC Juice Hess MD   200 mcg at 06/20/25 0656    metroNIDAZOLE (FLAGYL) infusion 500 mg  500 mg Intravenous Q12H Juice Hess MD   500 mg at 06/20/25 1310    sodium chloride (PF) 0.9% PF flush 3 mL  3 mL Intracatheter Q8H [facility] Juice Hess MD           Data   Recent Labs   Lab 06/20/25  0813 06/19/25  2044   WBC 8.9 15.0*   HGB 15.5 16.2   MCV 85 86    257    140   POTASSIUM 4.0 4.1   CHLORIDE 102 102   CO2 25 28   BUN 10.9 11.7   CR 0.89 0.94   ANIONGAP 11 10   MARYA 8.5* 9.0   GLC 96 88   ALBUMIN 3.8 4.3   PROTTOTAL 6.1* 6.9   BILITOTAL 0.7 0.6   ALKPHOS 46 50   ALT 41 44   AST 29 30       Imaging:   Recent Results (from the past 24 hours)   CT Abdomen Pelvis w Contrast    Narrative    EXAM: CT ABDOMEN PELVIS W CONTRAST  LOCATION: [facility]  DATE: 6/19/2025    INDICATION: recent diverticulitis, ongoing bleeding today  COMPARISON: CT from 6/6/2025  TECHNIQUE: CT scan of the abdomen and pelvis was performed following injection of IV contrast. Multiplanar reformats were obtained. Dose reduction techniques were used.  CONTRAST: 135 mL Isovue   370    FINDINGS:   LOWER CHEST: Normal.    HEPATOBILIARY: Small amount of focal fat along the falciform ligament. Normal gallbladder.    PANCREAS: Normal.    SPLEEN: Normal.    ADRENAL GLANDS: Normal.    KIDNEYS/BLADDER: Symmetric renal enhancement. No hydronephrosis. Bladder is normal.    BOWEL: No mechanical bowel obstruction. The appendix is normal. Colonic diverticulosis with mild wall thickening and pericolonic edema involving the descending colon. No free air    LYMPH NODES: Normal.    VASCULATURE:  Normal.    PELVIC ORGANS: Normal.    MUSCULOSKELETAL: Severe degenerative disc height loss in the lumbar spine.      Impression    IMPRESSION:   1.  Mild acute, uncomplicated diverticulitis of the descending colon.

## 2025-06-20 NOTE — CONSULTS
[facility]  Colon and Rectal Surgery Consult Note  Name: Kimberly Salinas    MRN: [id number]  YOB: 1970    Age: 55 year old  Date of admission: 6/19/2025  Primary care provider: Asad Vieira     Requesting Physician:  Dr. Hess  Reason for consult: Acute diverticulitis           History of Present Illness:   Kimberly Salinas is a 55 year old male with a PMH of HTN, seen at the request of Dr. Hess, who presents with Dr. oLzano, who presents today with concerns for rectal bleeding.  He was treated with oral antibiotics for diverticulitis about 2 weeks ago.  His symptoms at that time consisted of abdominal pain as well as rectal bleeding.  He had mostly been on a liquid diet since this occurrence.  His abdominal pain did improve but he did note some slight discomfort bowel movements a couple days ago.  Yesterday, he did note some bright red blood in the stool and this is what prompted ED presentation as he was suspicious of recurrent diverticulitis.  He denies any fever, chills, nausea, or vomiting.  He was having relatively normal bowel movements.    In the ED, he was hypertensive but vitals were otherwise stable.  Abdominal exam was notable for some mild left lower quadrant tenderness but otherwise benign.  Labs demonstrated WBC 15 and was otherwise unremarkable.  Hemoglobin was normal at 16.2.  Lactic acid was also normal.  CT abdomen and pelvis demonstrated mild acute uncomplicated diverticulitis of the descending colon.  Due to these findings, he was admitted to the hospitalist for further evaluation management.  CRS was thus consulted.    Today, the patient reports that he is feeling well and has not noted any further rectal bleeding.  He denies any abdominal pain, nausea, or vomiting.  He denies any known family history of colon or rectal cancer or IBD.  Prior to this episode of diverticulitis 2 weeks ago, he has never had diverticulitis previously.  His WBC today has  "normalized.  He is on IV ciprofloxacin and Flagyl.        Colonoscopy History: Per patient, done 2 or 3 years ago.  Was told that he was recommended to have a follow-up colonoscopy after this most recent bout of diverticulitis.    Past abdominal surgery: None            Past Medical History:     Past Medical History:   Diagnosis Date    Enlargement of thyroid     Kidney stones              Past Surgical History:     Past Surgical History:   Procedure Laterality Date    ENT SURGERY      wisdom teeth removal    neck lumpectomy      benign    ORTHOPEDIC SURGERY      ORIF left wrist    THYROIDECTOMY Bilateral 2/2/2021    Procedure: TOTAL THYROIDECTOMY;  Surgeon: Asad Phillip MD;  Location:  OR               Social History:     Social History     Tobacco Use    Smoking status: Never    Smokeless tobacco: Never   Substance Use Topics    Alcohol use: Yes     Comment: 2-4 times a week             Family History:     Family History   Problem Relation Age of Onset    No Known Problems Mother     No Known Problems Father              Allergies:     Allergies   Allergen Reactions    Penicillins      Per patient taken as a child and \"almost killed\" him.             Medications:     Current Facility-Administered Medications   Medication Dose Route Frequency Provider Last Rate Last Admin    ciprofloxacin (CIPRO) infusion 400 mg  400 mg Intravenous Q12H Juice Hess MD        levothyroxine (SYNTHROID/LEVOTHROID) tablet 200 mcg  200 mcg Oral QAM AC Juice Hess MD   200 mcg at 06/20/25 0656    metroNIDAZOLE (FLAGYL) infusion 500 mg  500 mg Intravenous Q12H Juice Hess MD        sodium chloride (PF) 0.9% PF flush 3 mL  3 mL Intracatheter Q8H [facility] Juice Hess MD                 Review of Systems:   A comprehensive greater than 10 system review of systems was carried out.  Pertinent positives and negatives are noted above.  Otherwise negative for contributory info.            Physical Exam:     Blood pressure (!) 144/79, " "pulse (!) 48, temperature 97.3  F (36.3  C), temperature source Oral, resp. rate 16, height 1.854 m (6' 1\"), weight 120.8 kg (266 lb 5.1 oz), SpO2 96%.    Intake/Output Summary (Last 24 hours) at 6/20/2025 1040  Last data filed at 6/20/2025 0038  Gross per 24 hour   Intake 200 ml   Output --   Net 200 ml     Exam:  General - Awake alert and oriented, appears stated age  Pulm - Non-labored breathing with normal respiratory effort  CVS - reg rate and rhythm, no peripheral edema  Abd -soft, nontender, nondistended.  No guarding, rigidity or peritoneal signs.   Neuro - CN II-XII grossly intact  Musculoskeletal - extremities with no clubbing, cyanosis or edema; able to ambulate  Psych - responsive, alert, cooperative; oriented x3; appropriate mood and affect  External/skin - inspection reveals no rashes, lesions or ulcers, normal coloring             Data Reviewed:     Recent Results (from the past 24 hours)   CT Abdomen Pelvis w Contrast    Narrative    EXAM: CT ABDOMEN PELVIS W CONTRAST  LOCATION: [facility]  DATE: 6/19/2025    INDICATION: recent diverticulitis, ongoing bleeding today  COMPARISON: CT from 6/6/2025  TECHNIQUE: CT scan of the abdomen and pelvis was performed following injection of IV contrast. Multiplanar reformats were obtained. Dose reduction techniques were used.  CONTRAST: 135 mL Isovue   370    FINDINGS:   LOWER CHEST: Normal.    HEPATOBILIARY: Small amount of focal fat along the falciform ligament. Normal gallbladder.    PANCREAS: Normal.    SPLEEN: Normal.    ADRENAL GLANDS: Normal.    KIDNEYS/BLADDER: Symmetric renal enhancement. No hydronephrosis. Bladder is normal.    BOWEL: No mechanical bowel obstruction. The appendix is normal. Colonic diverticulosis with mild wall thickening and pericolonic edema involving the descending colon. No free air    LYMPH NODES: Normal.    VASCULATURE: Normal.    PELVIC ORGANS: Normal.    MUSCULOSKELETAL: Severe degenerative disc height " loss in the lumbar spine.      Impression    IMPRESSION:   1.  Mild acute, uncomplicated diverticulitis of the descending colon.       Recent Labs   Lab 06/20/25  0813 06/19/25 2044   WBC 8.9 15.0*   HGB 15.5 16.2   HCT 45.6 48.0   MCV 85 86    257          Lab Results   Component Value Date     06/20/2025     06/19/2025     06/06/2025     02/03/2021    Lab Results   Component Value Date    CHLORIDE 102 06/20/2025    CHLORIDE 102 06/19/2025    CHLORIDE 101 06/06/2025    CHLORIDE 102 02/03/2021    Lab Results   Component Value Date    BUN 10.9 06/20/2025    BUN 11.7 06/19/2025    BUN 11.2 06/06/2025    BUN 14 02/03/2021      Lab Results   Component Value Date    POTASSIUM 4.0 06/20/2025    POTASSIUM 4.1 06/19/2025    POTASSIUM 4.1 06/06/2025    POTASSIUM 4.3 02/03/2021    Lab Results   Component Value Date    CO2 25 06/20/2025    CO2 28 06/19/2025    CO2 24 06/06/2025    CO2 25 02/03/2021    Lab Results   Component Value Date    CR 0.89 06/20/2025    CR 0.94 06/19/2025    CR 0.86 06/06/2025    CR 0.91 02/03/2021    CR 0.96 02/02/2021            Assessment and Plan:   Kimberly Salinas is a 55 year old male with HTN who presented with bleeding.  He was recently treated for diverticulitis with oral antibiotics about 2 weeks ago.  He had very minimal pain at this time but also had a bit of rectal bleeding.  He had been also on a liquid diet since then.  His symptoms had mostly resolved but he did have a bit more rectal bleeding yesterday which prompted presentation to the ED.  In the ED, he had leukocytosis to 15 and hemoglobin was stable at 16.2.  CT demonstrated mild, acute uncomplicated diverticulitis.  He was started on IV Cipro and Flagyl.  Today, abdominal exam is benign, leukocytosis has resolved, and he is tolerating clear liquids.    No acute surgical intervention at this time.  He is overall nontoxic-appearing and his abdominal exam is benign.  This will likely improve with  conservative measures.  Will advnce to full liquids today and likely low fiber later today.  Continue IV antibiotics while in the hospital and transition to p.o. antibiotics for 7 to 10 days as an outpatient.  He will need a follow-up colonoscopy in 6 to 8 weeks to assess resolution of symptoms and rule out any underlying causes.  He fails to improve with conservative measures, there is a possibility he would need surgery this admission which would entail sigmoid colectomy with end colostomy or diverting loop ileostomy.  This would be temporary and the ostomy would be reversed in 6 to 9 months.  CRS will continue to follow but anticipate discharge in the next 1 to 2 days if continues to do well.    Plan:  Admit to hospitalist  Surgery: No indication for urgent surgery at this time.  Diet: Full liquids, likely low fiber diet this afternoon.  If worsening abdominal pain or nausea, would back down to liquids  IV Fluids: Per medicine  Antibiotics: Continue IV antibiotics while in the hospital, transition to p.o. for 7 to 10 days at discharge  Medications: Per medicine  I&O s:  strict I&O s   Labs:   - Reviewed: Yes  - Ordered: None  Imaging:   - Dr. Miles and myself have personally viewed: CT abd/pelvis  - Ordered:  none  Activity:  OOB, ambulate as able  DVT prophylaxis: SCD s  This plan has been discussed with Dr. Miles    Patient specific identified risk factors considered as part of today s evaluation include: BMI >35, history of diverticulitis    Additional history obtained from patient.  Time spent on consultation: 50 minutes, greater than 50% spent on counseling and/or coordination of care.      Chelo Lou PA-C  Physician Assistant    [facility]  [address] [address]  T: [phone]  F: [phone]

## 2025-06-20 NOTE — PROGRESS NOTES
"SPIRITUAL HEALTH SERVICES - Progress Note  [facility]    Referral Source: Patient responded \"Yes\" to the nursing assessment: \"Do you have any spiritual/Confucianism beliefs that will affect your care?\"     - Kimberly graciously clarified the pronunciation of their name as \"[id number]\"  - Kimberly directed me to change their Confucianism/spiritual status as \"none.\"  - They very politely reassured me that \"anything in my care at the hospital that intersects with Spiritual Health is covered already by my relational [place].\"   - No need for future visits from Spiritual Health    Plan: Pt is aware of Spiritual Health Services, declines any future       Sumeet Owen    Intern    [facility] available 24/7 for emergent requests/referrals, either by paging the on-call  or by entering an ASAP/STAT consult in Epic (this will also page the on-call ).   "

## 2025-06-20 NOTE — H&P
[facility]    History and Physical - Hospitalist Service       Date of Admission:  6/19/2025    Assessment & Plan      Kimberly Salinas is a 55 year old male admitted on 6/19/2025. He presents with rectal bleeding.       Acute diverticulitis    Rectal bleeding    Assessment:  pt was recently diagnosed with diverticulitis after rectal bleeding on 6/6 and recetly finished five days of antibiotics and reports he was feeling better. Today, started having rectal bleeding again at 1330.  Denies abdominal pain, nausea or vomiting. Hgb stable at 16.2, WBC 15.0 otherwise HD stable. No fevers or chills. Overall non septic appearing. CT ABDOMEN shows Mild acute, uncomplicated diverticulitis of the descending colon.     Plan:   -Admit to inpatient  -CRS eval  -Clear liquid diet  -IV Cipro and Flagy  -IVF  -Pain control as needed  -Follow vitals/temp  -CBC in AM      HTN (hypertension)    Assessment/Plan: not on anti-HTNs      Hypothyroidism    Assessment/Plan: continue PTA Synthroid      Insomnia     Anxiety    Assessment/Plan: stable, sleep aid as needed          Diet:  Clear Liquid Diet  DVT Prophylaxis: Pneumatic Compression Devices  Weiss Catheter: Not present  Lines: None     Cardiac Monitoring: None  Code Status:  FULL CODE    Clinically Significant Risk Factors Present on Admission   { TIP  This section helps capture the illness of the patient on admission.     - Review diagnoses highlighted in blue; right click, edit & delete if not appropriate   - If blank, no additional diagnoses identified   :64405}                # Hypertension: Noted on problem list                      Disposition Plan   {TIP  The patient's Medical Readiness for Discharge [MRD] has been documented today or is 'Ready Now'. Last Documentation- Anticipated in 2-4 Days   Use SmartList below if a change is needed.  :27177}  Medically Ready for Discharge: Anticipated in 2-4 Days           Juice Hess MD  Hospitalist  [facility]  Securely message with [employer] (more info)  Text page via [facility] Paging/Directory     ______________________________________________________________________    Chief Complaint     Rectal Bleeding    History is obtained from the patient    History of Present Illness     Kimberly Salinas is a 55 year old male with past medical history of diverticulitis, hypertension, insomnia anxiety, hypothyroidism who presents for evaluation of rectal bleeding.    Patient was recently seen in the ED for similar symptoms of rectal bleeding/bright red blood in his stool and was diagnosed with diverticulitis.  Was discharged in stable condition of the ED with antibiotics and has been on a mostly liquid diet since then.  He reports that he was doing well and had not had any recurrent rectal bleeding until the afternoon today prior to admission.  He otherwise denies any abdominal pain.  He does endorse some slight discomfort with his bowel movements 2 days ago but for the most part is having regular bowel movements without any pain or blood before today.  He has no fevers or chills.  He has no nausea or vomiting.  Denies any recent hemorrhoids.  He denies any significant alcohol use since his discharge.  Denies any illicit drug use.  His last colonoscopy was roughly 2 to 3 years ago.  He otherwise has no urinary complaints of urgency or frequency, no dysuria or hematuria.  At this time the patient has no other complaints.    Past Medical History    Past Medical History:   Diagnosis Date    Enlargement of thyroid     Kidney stones        Past Surgical History   Past Surgical History:   Procedure Laterality Date    ENT SURGERY      wisdom teeth removal    neck lumpectomy      benign    ORTHOPEDIC SURGERY      ORIF left wrist    THYROIDECTOMY Bilateral 2/2/2021    Procedure: TOTAL THYROIDECTOMY;  Surgeon: Asad Phillip MD;  Location:  OR       Prior to Admission Medications   Prior to  "Admission Medications   Prescriptions Last Dose Informant Patient Reported? Taking?   GLUCOSAMINE HCL PO  Self Yes No   Sig: Take 1 Dose by mouth daily as needed    VITAMIN D PO  Self Yes No   Sig: Take 1 Dose by mouth daily    calcium carbonate 500 mg, elemental, (OSCAL) 500 MG tablet   No No   Sig: Take 2 tablets (1,000 mg) by mouth 2 times daily (with meals)   Patient taking differently: Take 1,000 mg by mouth 2 times daily (with meals) Currently taking 600mg 2/25/21   levothyroxine (SYNTHROID/LEVOTHROID) 200 MCG tablet   No No   Sig: Take 1 tablet (200 mcg) by mouth every morning (before breakfast)   multivitamin w/minerals (MULTI-VITAMIN) tablet  Self Yes No   Sig: Take 1 tablet by mouth daily      Facility-Administered Medications: None        Review of Systems    The 10 point Review of Systems is negative other than noted in the HPI or here.     Social History   I have reviewed this patient's social history and updated it with pertinent information if needed.  Social History     Tobacco Use    Smoking status: Never    Smokeless tobacco: Never   Substance Use Topics    Alcohol use: Yes     Comment: 2-4 times a week    Drug use: Not Currently         Family History   I have reviewed this patient's family history and updated it with pertinent information if needed.  Family History   Problem Relation Age of Onset    No Known Problems Mother     No Known Problems Father      Allergies   Allergies   Allergen Reactions    Penicillins      Per patient taken as a child and \"almost killed\" him.     ------------------------------------------------------------------------     Physical Exam   Vital Signs: Temp: 97.6  F (36.4  C) Temp src: Temporal BP: (!) 155/101 Pulse: 51   Resp: 16 SpO2: 100 %      Weight: 0 lbs 0 oz      ----------------------------------------------------------------------------------------------------------------------------------    Medical Decision Making   { TIP   MDM Calculator    [facility] amol (w/ " times)    Coding Support Chat  Billing is now based on time OR medical decision making complexity. Medical decision making included in your A&P does NOT need to be re-documented here.    :29408}    55 MINUTES SPENT BY ME on the date of service doing chart review, history, exam, documentation & further activities per the note.      Data   ------------------------- PAST 24 HR DATA REVIEWED -----------------------------------------------    I have personally reviewed the following data over the past 24 hrs:    15.0 (H)  \   16.2   / 257     140 102 11.7 /  88   4.1 28 0.94 \     ALT: 44 AST: 30 AP: 50 TBILI: 0.6   ALB: 4.3 TOT PROTEIN: 6.9 LIPASE: N/A     Procal: N/A CRP: N/A Lactic Acid: 0.8         Imaging results reviewed over the past 24 hrs:   Recent Results (from the past 24 hours)   CT Abdomen Pelvis w Contrast    Narrative    EXAM: CT ABDOMEN PELVIS W CONTRAST  LOCATION: [facility]  DATE: 6/19/2025    INDICATION: recent diverticulitis, ongoing bleeding today  COMPARISON: CT from 6/6/2025  TECHNIQUE: CT scan of the abdomen and pelvis was performed following injection of IV contrast. Multiplanar reformats were obtained. Dose reduction techniques were used.  CONTRAST: 135 mL Isovue   370    FINDINGS:   LOWER CHEST: Normal.    HEPATOBILIARY: Small amount of focal fat along the falciform ligament. Normal gallbladder.    PANCREAS: Normal.    SPLEEN: Normal.    ADRENAL GLANDS: Normal.    KIDNEYS/BLADDER: Symmetric renal enhancement. No hydronephrosis. Bladder is normal.    BOWEL: No mechanical bowel obstruction. The appendix is normal. Colonic diverticulosis with mild wall thickening and pericolonic edema involving the descending colon. No free air    LYMPH NODES: Normal.    VASCULATURE: Normal.    PELVIC ORGANS: Normal.    MUSCULOSKELETAL: Severe degenerative disc height loss in the lumbar spine.      Impression    IMPRESSION:   1.  Mild acute, uncomplicated diverticulitis of the descending  colon.     ------------------------- ENCOUNTER LABS ----------------------------------------------------------------  Recent Labs   Lab 06/19/25 2044   WBC 15.0*   HGB 16.2   MCV 86         POTASSIUM 4.1   CHLORIDE 102   CO2 28   BUN 11.7   CR 0.94   ANIONGAP 10   MARYA 9.0   GLC 88   ALBUMIN 4.3   PROTTOTAL 6.9   BILITOTAL 0.6   ALKPHOS 50   ALT 44   AST 30       Most Recent 3 CBC's:  Recent Labs   Lab Test 06/19/25 2044 06/06/25  1149 02/03/21  0604   WBC 15.0* 16.6* 16.3*   HGB 16.2 15.9 14.8   MCV 86 84 83    215 217     Most Recent 3 BMP's:  Recent Labs   Lab Test 06/19/25 2044 06/06/25  1149 02/03/21  0604    134* 133   POTASSIUM 4.1 4.1 4.3   CHLORIDE 102 101 102   CO2 28 24 25   BUN 11.7 11.2 14   CR 0.94 0.86 0.91   ANIONGAP 10 9 6   MARYA 9.0 8.7* 8.6   GLC 88 93 105*     Most Recent 2 LFT's:  Recent Labs   Lab Test 06/19/25 2044 06/06/25  1149   AST 30 24   ALT 44 30   ALKPHOS 50 60   BILITOTAL 0.6 0.5     Most Recent 3 INR's:No lab results found.  Most Recent 3 Troponin's:No lab results found.  Most Recent 3 BNP's:No lab results found.  Most Recent D-dimer:No lab results found.  Most Recent Cholesterol Panel:No lab results found.  Most Recent 6 Bacteria Isolates From Any Culture (See EPIC Reports for Culture Details):No lab results found.  Most Recent TSH and T4:No lab results found.  Most Recent Hemoglobin A1c:No lab results found.  Most Recent 6 glucoses:  Recent Labs   Lab Test 06/19/25 2044 06/06/25  1149 02/03/21  0604   GLC 88 93 105*     Most Recent Urinalysis:No lab results found.  Most Recent ESR & CRP:No lab results found.

## 2025-06-20 NOTE — ED NOTES
"[facility]  ED Nurse Handoff Report    ED Chief complaint: Rectal Bleeding      ED Diagnosis:   Final diagnoses:   Acute diverticulitis - recurrent   Rectal bleeding - suspected diverticular bleed       Code Status: Full Code    Allergies:   Allergies   Allergen Reactions    Penicillins      Per patient taken as a child and \"almost killed\" him.       Patient Story: pt was recently diagnosed with diverticulitis after rectal bleeding on 6/6 - finished five days of antibiotics and was feeling better. Today, started having rectal bleeding again at 1330.   Focused Assessment:  pt a&o x4, respirations easy and unlabored. Denies abdominal pain, nausea or vomiting.    Treatments and/or interventions provided:   Results for orders placed or performed during the hospital encounter of 06/19/25   CT Abdomen Pelvis w Contrast     Status: None    Narrative    EXAM: CT ABDOMEN PELVIS W CONTRAST  LOCATION: [facility]  DATE: 6/19/2025    INDICATION: recent diverticulitis, ongoing bleeding today  COMPARISON: CT from 6/6/2025  TECHNIQUE: CT scan of the abdomen and pelvis was performed following injection of IV contrast. Multiplanar reformats were obtained. Dose reduction techniques were used.  CONTRAST: 135 mL Isovue   370    FINDINGS:   LOWER CHEST: Normal.    HEPATOBILIARY: Small amount of focal fat along the falciform ligament. Normal gallbladder.    PANCREAS: Normal.    SPLEEN: Normal.    ADRENAL GLANDS: Normal.    KIDNEYS/BLADDER: Symmetric renal enhancement. No hydronephrosis. Bladder is normal.    BOWEL: No mechanical bowel obstruction. The appendix is normal. Colonic diverticulosis with mild wall thickening and pericolonic edema involving the descending colon. No free air    LYMPH NODES: Normal.    VASCULATURE: Normal.    PELVIC ORGANS: Normal.    MUSCULOSKELETAL: Severe degenerative disc height loss in the lumbar spine.      Impression    IMPRESSION:   1.  Mild acute, uncomplicated " diverticulitis of the descending colon.   Comprehensive metabolic panel     Status: Normal   Result Value Ref Range    Sodium 140 135 - 145 mmol/L    Potassium 4.1 3.4 - 5.3 mmol/L    Carbon Dioxide (CO2) 28 22 - 29 mmol/L    Anion Gap 10 7 - 15 mmol/L    Urea Nitrogen 11.7 6.0 - 20.0 mg/dL    Creatinine 0.94 0.67 - 1.17 mg/dL    GFR Estimate >90 >60 mL/min/1.73m2    Calcium 9.0 8.8 - 10.4 mg/dL    Chloride 102 98 - 107 mmol/L    Glucose 88 70 - 99 mg/dL    Alkaline Phosphatase 50 40 - 150 U/L    AST 30 0 - 45 U/L    ALT 44 0 - 70 U/L    Protein Total 6.9 6.4 - 8.3 g/dL    Albumin 4.3 3.5 - 5.2 g/dL    Bilirubin Total 0.6 <=1.2 mg/dL   CBC with platelets and differential     Status: Abnormal (Preliminary result)   Result Value Ref Range    WBC Count 15.0 (H) 4.0 - 11.0 10e3/uL    RBC Count 5.61 4.40 - 5.90 10e6/uL    Hemoglobin 16.2 13.3 - 17.7 g/dL    Hematocrit 48.0 40.0 - 53.0 %    MCV 86 78 - 100 fL    MCH 28.9 26.5 - 33.0 pg    MCHC 33.8 31.5 - 36.5 g/dL    RDW 14.0 10.0 - 15.0 %    Platelet Count 257 150 - 450 10e3/uL   Extra Tube ([place] Draw)     Status: None    Narrative    The following orders were created for panel order Extra Tube ([place] Draw).  Procedure                               Abnormality         Status                     ---------                               -----------         ------                     Extra Blue Top Tube[[id number]]                             Final result               Extra Red Top Tube[[id number]]                              Final result                 Please view results for these tests on the individual orders.   Extra Blue Top Tube     Status: None   Result Value Ref Range    Hold Specimen JIC    Extra Red Top Tube     Status: None   Result Value Ref Range    Hold Specimen JIC    iStat Gases (lactate) venous, POCT     Status: Abnormal   Result Value Ref Range    Lactic Acid POCT 0.8 0.7 - 2.0 mmol/L    Bicarbonate Venous POCT 30 (H) 21 - 28 mmol/L    O2 Sat,  Venous POCT 34 (L) 70 - 75 %    pCO2 Venous POCT 50 40 - 50 mm Hg    pH Venous POCT 7.39 7.32 - 7.43    pO2 Venous POCT 22 (L) 25 - 47 mm Hg    Base Excess/Deficit (+/-) POCT 4.0 (H) -3.0 - 3.0 mmol/L   RBC and Platelet Morphology     Status: None   Result Value Ref Range    RBC Morphology Confirmed RBC Indices     Platelet Assessment  Automated Count Confirmed. Platelet morphology is normal.     Automated Count Confirmed. Platelet morphology is normal.   Manual Differential     Status: Abnormal (Preliminary result)   Result Value Ref Range    % Neutrophils 40 %    % Lymphocytes 47 %    % Monocytes 9 %    % Eosinophils 3 %    % Basophils 0 %    % Myelocytes 1 %    Absolute Neutrophils 6.0 1.6 - 8.3 10e3/uL    Absolute Lymphocytes 7.1 (H) 0.8 - 5.3 10e3/uL    Absolute Monocytes 1.4 (H) 0.0 - 1.3 10e3/uL    Absolute Eosinophils 0.5 0.0 - 0.7 10e3/uL    Absolute Basophils 0.0 0.0 - 0.2 10e3/uL    Absolute Myelocytes 0.2 (H) <=0.0 10e3/uL   CBC with platelets differential     Status: Abnormal (In process)    Narrative    The following orders were created for panel order CBC with platelets differential.  Procedure                               Abnormality         Status                     ---------                               -----------         ------                     CBC with platelets and ...[[id number]]  Abnormal            Preliminary result         RBC and Platelet Morpho...[[id number]]                      Final result               Manual Differential[[id number]]         Abnormal            Preliminary result           Please view results for these tests on the individual orders.       Patient's response to treatments and/or interventions: fair    To be done/followed up on inpatient unit:  monitor for rectal bleeding    Does this patient have any cognitive concerns?: none    Activity level - Baseline/Home:  Independent  Activity Level - Current:   Independent    Patient's Preferred language:  English   Needed?: No    Isolation: None  Infection: Not Applicable  Sepsis treatment initiated: No  Patient tested for COVID 19 prior to admission: NO  Bariatric?: No    Vital Signs:   Vitals:    06/19/25 1442 06/19/25 2012   BP: (!) 152/94 (!) 155/101   Pulse: 56 51   Resp: 16    Temp: 97.6  F (36.4  C)    TempSrc: Temporal    SpO2: 97% 100%       Cardiac Rhythm:     Was the PSS-3 completed:   Yes  What interventions are required if any?               Family Comments: n/a  OBS brochure/video discussed/provided to patient/family: N/A              Name of person given brochure if not patient: n/a              Relationship to patient: n/a    For the majority of the shift this patient's behavior was Green.   Behavioral interventions performed were reassurance and information.    ED NURSE PHONE NUMBER: *[phone]

## 2025-06-20 NOTE — PROGRESS NOTES
6/20 1500-1930  Diagnosis: rectal bleed. Diverticulitis   Mental Status: A&Ox 4  Activity/dangle IND  Diet: Low fiber diet  Pain: denies  Weiss/Voiding: voiding  Tele/Restraints/Iso: NA  02/LDA: IVF, RA  D/C Date: ?  Other Info:  diet increased to FL - tolerated well,

## 2025-06-20 NOTE — ED PROVIDER NOTES
Emergency Department Note      History of Present Illness     Chief Complaint   Rectal Bleeding      HPI   Kimberly Salinas is a 55 year old male with a history of hypertension who presents to the ED for rectal bleeding. The patient reports that 06/06/2025 he came into the ED because of significant bright red blood in his stool as well as ongoing red coloration in his stool which prompted his visit where he was then diagnosed with diverticulitis. He was prescribed antibiotics for five days and was initially on a majority liquid diet where he was able to slowly reintroduce solid foods. He states that since then, he hasn't had any rectal bleeding until approximately today at 1330 where he once again had bright red blood in his stool prompting his visit to the ED. He details that his stool is normal forming. He endorses having slight discomfort with bowel movements two days ago, however has been having them regularly without pain. He denies any abdominal pain, chills, nausea, vomiting, or dysuria. He also denies any recent hemorrhoids. Of note, his last colonoscopy was about 2-3 years ago and was scheduled for one 6-8 weeks after his 06/06/2025 visit.      Independent Historian   None    Review of External Notes   ***    Past Medical History     Medical History and Problem List   Anxiety  Hypertension  Insomnia  Multinodular goiter     Medications   Cozaar  Robaxin  Oscal  Synthroid/Levothroid    Surgical History   [place] teeth removal  Benign neck lumpectomy  Thyroidectomy  Left wrist orthopedic surgery    Physical Exam     Patient Vitals for the past 24 hrs:   BP Temp Temp src Pulse Resp SpO2   06/19/25 2012 (!) 155/101 -- -- 51 -- 100 %   06/19/25 1442 (!) 152/94 97.6  F (36.4  C) Temporal 56 16 97 %     Physical Exam  Physical Exam:  General: alert, no acute distress  Head: normocephalic, atraumatic  Eyes: normal conjunctiva, PERRLA, EOMI  Ears: External ears appear normal.   Nose: no signs of bleeding   Throat:  "moist mucous membranes  Neck: Supple. No JVD  CV: regular rate and rhythm  Pulm: lungs clear to ausculation bilaterally. No respiratory distress.   Abdomen: soft, non-tender, non-distended  MSK: No midline tenderness  Ext: normal range of motion of all extremities. No gross deformities  Skin: warm, dry, no rashes  Neuro: alert and oriented  Psych: Appropriate mood. Cooperative      Diagnostics     Lab Results   Labs Ordered and Resulted from Time of ED Arrival to Time of ED Departure   CBC WITH PLATELETS AND DIFFERENTIAL - Abnormal       Result Value    WBC Count 15.0 (*)     RBC Count 5.61      Hemoglobin 16.2      Hematocrit 48.0      MCV 86      MCH 28.9      MCHC 33.8      RDW 14.0      Platelet Count 257     ISTAT GASES LACTATE VENOUS POCT - Abnormal    Lactic Acid POCT 0.8      Bicarbonate Venous POCT 30 (*)     O2 Sat, Venous POCT 34 (*)     pCO2 Venous POCT 50      pH Venous POCT 7.39      pO2 Venous POCT 22 (*)     Base Excess/Deficit (+/-) POCT 4.0 (*)    COMPREHENSIVE METABOLIC PANEL - Normal    Sodium 140      Potassium 4.1      Carbon Dioxide (CO2) 28      Anion Gap 10      Urea Nitrogen 11.7      Creatinine 0.94      GFR Estimate >90      Calcium 9.0      Chloride 102      Glucose 88      Alkaline Phosphatase 50      AST 30      ALT 44      Protein Total 6.9      Albumin 4.3      Bilirubin Total 0.6     RBC AND PLATELET MORPHOLOGY    RBC Morphology Confirmed RBC Indices      Platelet Assessment        Value: Automated Count Confirmed. Platelet morphology is normal.       Imaging   CT Abdomen Pelvis w Contrast    (Results Pending)         Independent Interpretation   {IndependentReview:414034::\"None\"}    ED Course      Medications Administered   Medications - No data to display    Procedures   Procedures     Discussion of Management   {Consults/Care Discussions:882954::\"None\"}    ED Course   ED Course as of 06/19/25 2146   Thu Jun 19, 2025 2000 I obtained history and examined the patient as noted " "above         Additional Documentation  {EPPAAdditionalPhrase:175834::\"None\"}    Medical Decision Making / Diagnosis     CMS Diagnoses: {Sepsis/Septic Shock/Stemi/Stroke:688672::\"None\"}    MIPS   {EPPA MIPS:220833::\"None\"}               LYNETTE Salinas is a 55 year old male ***    Disposition   {EPPAFV Dispo:827974}    Diagnosis   No diagnosis found.     Discharge Medications   New Prescriptions    No medications on file         Scribe Disclosure:  I, Jose Sunshine, am serving as a scribe at 7:32 PM on 6/19/2025 to document services personally performed by Demarcus Lennon PA-C based on my observations and the provider's statements to me.     "   naloxone (NARCAN) injection 0.4 mg (has no administration in time range)     Or   naloxone (NARCAN) injection 0.2 mg (has no administration in time range)     Or   naloxone (NARCAN) injection 0.4 mg (has no administration in time range)   sodium chloride 0.9 % bag for CT scan flush (79 mLs Intravenous $Given 6/19/25 2210)   iopamidol (ISOVUE-370) solution 135 mL (135 mLs Intravenous $Given 6/19/25 2210)   ciprofloxacin (CIPRO) infusion 400 mg (0 mg Intravenous Stopped 6/20/25 0038)   metroNIDAZOLE (FLAGYL) infusion 500 mg (0 mg Intravenous Stopped 6/20/25 0133)   losartan (COZAAR) tablet 100 mg (100 mg Oral $Given 6/20/25 0128)       Procedures   Procedures     Discussion of Management   Colorectal  Admitting Hospitalist, Dr. Cabezas    ED Course   ED Course as of 06/20/25 1308   Thu Jun 19, 202519, 2025 2000 I obtained history and examined the patient as noted above     2300 Discussed with Colorectal.    2311 Rechecked patient.    2330 Discussed with Dr. Cabezas, hospitalist. Accepted patient for admission.        Additional Documentation  None    Medical Decision Making / Diagnosis     CMS Diagnoses: None    MIPS   None      MDM   Kimberly Salinas is a 55 year old male who presents to the ED for evaluation of rectal bleeding.  Patient was seen in the ED on 6/6/2025 for abdominal pain and rectal bleeding.  He had a CT which showed acute diverticulitis and was ultimately discharged with Cipro and Flagyl for 5 days.  He returns today with recurrent episode of rectal bleeding. He states that he had about a 2/3 cup of bright red blood in his stool.  He also notes some lower abdominal discomfort 2 days ago while having a bowel movement.  No pain since then.  No fever or chills.  See further HPI details above.  Broad differential was considered including recurrent diverticulitis, abscess, perforation, infectious colitis, Crohn's, ulcerative colitis, malignancy, hemorrhoids, anal fissure, etc.  On exam, patient is well-appearing.   Vitals are stable.  Abdomen is soft and nontender.  Rectal exam is unremarkable without signs of melena or hematochezia.  No evidence of hemorrhoids or anal fissure.  No evidence of perirectal abscess.  CT abdomen/pelvis was obtained which revealed mild acute uncomplicated diverticulitis of the descending colon.  No evidence of bowel obstruction, abscess, or perforation.  Laboratory workup is notable for leukocytosis but is downward trending from recent visit.  Normal hemoglobin.  No electrolyte abnormalities or LARRY.  Normal LFTs and lipase. He is afebrile. Normal lactic. No signs of sepsis or septic shock. Given recurrent diverticulitis and rectal bleeding, I discussed with colorectal who recommended hospital mission for IV antibiotics, bowel rest, and colorectal consultation.  No indications for emergent surgical intervention at this time.  I discussed the plan with patient who is agreeable.  He was placed on a clear liquid diet.  IV antibiotics were given.  I discussed with the hospital team who accepted the patient for admission.  He remained vitally stable.  All questions were answered.    Disposition   The patient was admitted to the hospital.     Diagnosis     ICD-10-CM    1. Acute diverticulitis  K57.92     recurrent      2. Rectal bleeding  K62.5     suspected diverticular bleed           Discharge Medications   Current Discharge Medication List            Scribe Disclosure:  I, Jose Sunshine, am serving as a scribe at 7:32 PM on 6/19/2025 to document services personally performed by Demarcus Lennon PA-C based on my observations and the provider's statements to me.        Demarcus Lennon PA-C  06/20/25 9664

## 2025-06-20 NOTE — ED PROVIDER NOTES
ED APC SUPERVISION NOTE:   I evaluated this patient in conjunction with Demarcus Lennon PA-C  I have participated in the care of the patient and personally performed key elements of the history, exam, and medical decision making.      HPI:   Kimberly Salinas is a 55 year old male who presents to the ED for evaluation of rectal bleeding. Patient reports that he was seen in the ED on 6/6/25 for hematochezia, where he was diagnosed with diverticulitis. He was discharged with antibiotics and put on a mostly liquid diet. He did not have any rectal bleeding from this point on until today at 1330 where he once again spotted bright red blood in his stool. He endorses slight discomfort with bowel movements, but they have been regular and well formed. He denies any recent hemorrhoids.    Independent Historian:   None    Review of External Notes: ***      EXAM:   ***    Independent Interpretation (X-rays, CTs, rhythm strip):  None    Consultations/Discussion of Management or Tests:  Admitting Hospitalist, Dr. Cabezas     MIPS:      None    MEDICAL DECISION MAKING/ASSESSMENT AND PLAN:   ***      DIAGNOSIS:   No diagnosis found.      Scribe Disclosure:  I, Stevan Gil, am serving as a scribe at 11:34 PM on 6/19/2025 to document services personally performed by Donato Mead MD based on my observations and the provider's statements to me.   6/19/2025  [facility] EMERGENCY DEPT   blood per rectum this afternoon.  He has been having discomfort bowel movements as well.  He denies any fevers.  CT does show mild acute uncomplicated diverticulitis of the descending colon.  He does have a leukocytosis of 15.  In terms of the bleeding his hemoglobin is normal at 16.2.  He does not have any electrolyte abnormalities.  His lactic acid was also normal.  He will be started on antibiotics and brought into the hospital under the care of the hospitalist service.     DIAGNOSIS:     ICD-10-CM    1. Acute diverticulitis  K57.92     recurrent      2. Rectal bleeding  K62.5     suspected diverticular bleed            Scribe Disclosure:  I, Stevan Gil, am serving as a scribe at 11:34 PM on 6/19/2025 to document services personally performed by Donato Mead MD based on my observations and the provider's statements to me.   6/19/2025  [facility] EMERGENCY DEPT     Donato Mead MD  06/20/25 0120

## 2025-06-20 NOTE — PHARMACY-ADMISSION MEDICATION HISTORY
Pharmacist Admission Medication History    Admission medication history is complete. The information provided in this note is only as accurate as the sources available at the time of the update.    Information Source(s): Patient and CareEverywhere/SureScripts via in-person    Pertinent Information: Pt was prescribed a 5 day course of cipro and flagyl on 6/6 and has completed the course.    Changes made to PTA medication list:  Added: losartan  Deleted: calcium  Changed: glucosamine, Vit D    Allergies reviewed with patient and updates made in EHR: no    Medication History Completed By: Amie Rivera RPH 6/20/2025 10:24 AM    PTA Med List   Medication Sig Last Dose/Taking    Glucosamine HCl 1500 MG TABS Take 1,500 mg by mouth daily. 6/19/2025 Morning    levothyroxine (SYNTHROID/LEVOTHROID) 200 MCG tablet Take 1 tablet (200 mcg) by mouth every morning (before breakfast) 6/19/2025 Morning    losartan (COZAAR) 100 MG tablet Take 100 mg by mouth every evening. 6/19/2025 Evening    multivitamin w/minerals (MULTI-VITAMIN) tablet Take 1 tablet by mouth daily 6/19/2025 Morning    vitamin D3 (CHOLECALCIFEROL) 50 mcg (2000 units) tablet Take 1 tablet by mouth daily. 6/19/2025 Morning

## 2025-06-20 NOTE — PLAN OF CARE
Goal Outcome Evaluation:    Overall Patient Progress: no change    Date/Time 6/20/2025 [id number]    Trauma/Ortho/Medical (Choose one) Medical    Diagnosis: Acute diverticulitis, rectal bleeding  POD#: N/A  Mental Status: A&O x 4  Activity/dangle: Independent  Diet: Clear liquids  Pain: denies pain  Weiss/Voiding: bathroom  Tele/Restraints/Iso: N/A  02/LDA: RUE infusing NS @ 100 mL/hr  D/C Date: TBD  Other Info: VSS except for elevated BP

## 2025-06-20 NOTE — PLAN OF CARE
Diagnosis: rectal bleed. Diverticulitis   Mental Status: A&Ox 4  Activity/dangle IND  Diet: full liquids  Pain: denies  Weiss/Voiding: voiding  Tele/Restraints/Iso: NA  02/LDA: IVF, RA  D/C Date: ?  Other Info:  diet increased to FL - tolerated well,

## 2025-06-21 VITALS
TEMPERATURE: 98.4 F | HEART RATE: 51 BPM | BODY MASS INDEX: 35.68 KG/M2 | DIASTOLIC BLOOD PRESSURE: 84 MMHG | OXYGEN SATURATION: 93 % | RESPIRATION RATE: 16 BRPM | HEIGHT: 73 IN | WEIGHT: 269.18 LBS | SYSTOLIC BLOOD PRESSURE: 148 MMHG

## 2025-06-21 PROCEDURE — 250N000013 HC RX MED GY IP 250 OP 250 PS 637: Performed by: STUDENT IN AN ORGANIZED HEALTH CARE EDUCATION/TRAINING PROGRAM

## 2025-06-21 PROCEDURE — 250N000011 HC RX IP 250 OP 636: Performed by: STUDENT IN AN ORGANIZED HEALTH CARE EDUCATION/TRAINING PROGRAM

## 2025-06-21 PROCEDURE — 99231 SBSQ HOSP IP/OBS SF/LOW 25: CPT | Performed by: COLON & RECTAL SURGERY

## 2025-06-21 PROCEDURE — 99239 HOSP IP/OBS DSCHRG MGMT >30: CPT | Performed by: INTERNAL MEDICINE

## 2025-06-21 RX ORDER — MULTIVITAMIN
1 TABLET ORAL DAILY
Status: SHIPPED
Start: 2025-06-27

## 2025-06-21 RX ORDER — CIPROFLOXACIN 500 MG/1
500 TABLET, FILM COATED ORAL 2 TIMES DAILY
Qty: 12 TABLET | Refills: 0 | Status: SHIPPED | OUTPATIENT
Start: 2025-06-21 | End: 2025-06-27

## 2025-06-21 RX ORDER — METRONIDAZOLE 500 MG/1
500 TABLET ORAL 2 TIMES DAILY
Qty: 12 TABLET | Refills: 0 | Status: SHIPPED | OUTPATIENT
Start: 2025-06-21 | End: 2025-06-27

## 2025-06-21 RX ADMIN — LEVOTHYROXINE SODIUM 200 MCG: 100 TABLET ORAL at 06:50

## 2025-06-21 RX ADMIN — METRONIDAZOLE 500 MG: 500 INJECTION, SOLUTION INTRAVENOUS at 13:14

## 2025-06-21 RX ADMIN — METRONIDAZOLE 500 MG: 500 INJECTION, SOLUTION INTRAVENOUS at 01:41

## 2025-06-21 RX ADMIN — CIPROFLOXACIN 400 MG: 2 INJECTION, SOLUTION INTRAVENOUS at 11:54

## 2025-06-21 RX ADMIN — CIPROFLOXACIN 400 MG: 2 INJECTION, SOLUTION INTRAVENOUS at 00:32

## 2025-06-21 ASSESSMENT — SLEEP AND FATIGUE QUESTIONNAIRES
HOW LIKELY ARE YOU TO NOD OFF OR FALL ASLEEP WHEN YOU ARE A PASSENGER IN A CAR FOR AN HOUR WITHOUT A BREAK: WOULD NEVER DOZE
HOW LIKELY ARE YOU TO NOD OFF OR FALL ASLEEP WHILE SITTING INACTIVE IN A PUBLIC PLACE: WOULD NEVER DOZE
HOW LIKELY ARE YOU TO NOD OFF OR FALL ASLEEP WHILE LYING DOWN TO REST IN THE AFTERNOON WHEN CIRCUMSTANCES PERMIT: SLIGHT CHANCE OF DOZING
HOW LIKELY ARE YOU TO NOD OFF OR FALL ASLEEP WHILE SITTING AND TALKING TO SOMEONE: WOULD NEVER DOZE
HOW LIKELY ARE YOU TO NOD OFF OR FALL ASLEEP WHILE WATCHING TV: MODERATE CHANCE OF DOZING
HOW LIKELY ARE YOU TO NOD OFF OR FALL ASLEEP WHILE SITTING QUIETLY AFTER LUNCH WITHOUT ALCOHOL: WOULD NEVER DOZE
HOW LIKELY ARE YOU TO NOD OFF OR FALL ASLEEP IN A CAR, WHILE STOPPED FOR A FEW MINUTES IN TRAFFIC: WOULD NEVER DOZE
HOW LIKELY ARE YOU TO NOD OFF OR FALL ASLEEP WHILE SITTING AND READING: SLIGHT CHANCE OF DOZING

## 2025-06-21 ASSESSMENT — ACTIVITIES OF DAILY LIVING (ADL)
ADLS_ACUITY_SCORE: 19
ADLS_ACUITY_SCORE: 23
ADLS_ACUITY_SCORE: 19

## 2025-06-21 NOTE — PROGRESS NOTES
"MD Notification    Notified Person: MD    Notified Person Name: Rain Shafer    Notification Date/Time: 6/20/25 2210    Notification Interaction: james    Purpose of Notification: Messaged MD that the Pt takes losartan 100mg every evening outside of hospital, pharmacy left note in chart, med to be reviewed/orders to be put in.    Orders Received: No    Comments: MD says \"will defer to day hospitalist\"  "

## 2025-06-21 NOTE — PROGRESS NOTES
Reviewed discharge with pt, verbalized understanding. RX and paperwork given. Pt to discharge home, all belongings sent.

## 2025-06-21 NOTE — PROGRESS NOTES
COLON & RECTAL SURGERY  PROGRESS NOTE    June 21, 2025    SUBJECTIVE:  Doing well. No pain. Tolerating PO    OBJECTIVE:  Temp:  [98.3  F (36.8  C)-98.4  F (36.9  C)] 98.4  F (36.9  C)  Pulse:  [51-55] 51  Resp:  [16] 16  BP: (124-148)/(64-84) 148/84  SpO2:  [93 %-97 %] 93 %    Intake/Output Summary (Last 24 hours) at 6/21/2025 1105  Last data filed at 6/21/2025 1000  Gross per 24 hour   Intake 1820 ml   Output --   Net 1820 ml       GENERAL:  Awake, alert, no acute distress,   HEAD: Normocephalic atraumatic  SCLERA: Anicteric  EXTREMITIES: Warm and well perfused  ABDOMEN:  Soft, appropriately tender, non-distended. No guarding, rigidity, or peritoneal signs    LABS:  Lab Results   Component Value Date    WBC 8.9 06/20/2025    WBC 16.3 02/03/2021     Lab Results   Component Value Date    HGB 15.5 06/20/2025    HGB 14.8 02/03/2021     Lab Results   Component Value Date    HCT 45.6 06/20/2025    HCT 43.2 02/03/2021     Lab Results   Component Value Date     06/20/2025     02/03/2021     Last Basic Metabolic Panel:  Lab Results   Component Value Date     06/20/2025     02/03/2021      Lab Results   Component Value Date    POTASSIUM 4.0 06/20/2025    POTASSIUM 4.3 02/03/2021     Lab Results   Component Value Date    CHLORIDE 102 06/20/2025    CHLORIDE 102 02/03/2021     Lab Results   Component Value Date    MARYA 8.5 06/20/2025    MARYA 8.6 02/03/2021     Lab Results   Component Value Date    CO2 25 06/20/2025    CO2 25 02/03/2021     Lab Results   Component Value Date    BUN 10.9 06/20/2025    BUN 14 02/03/2021     Lab Results   Component Value Date    CR 0.89 06/20/2025    CR 0.91 02/03/2021     Lab Results   Component Value Date    GLC 96 06/20/2025     02/03/2021       ASSESSMENT/PLAN: 55-year-old male presenting with rectal bleeding and found to have uncomplicated diverticulitis. No further bleeding since admission.      Diet as tolerated. Ok for high fiber if no pain  Complete abx as  prescribed  OK for discharge  Colonoscopy in 6-8 weeks, he plans to contact [facility] who have done scope previously.      For questions/paging, please contact the [facility] office at [phone].    Tony Guillen MD    [facility]  [address]  [place] [address]  T: [phone]  F: [phone]

## 2025-06-21 NOTE — PLAN OF CARE
Goal Outcome Evaluation:    Date/Time 6/20 1900-0730    Diagnosis: rectal bleed, diverticulitis  Mental Status: A&Ox4  Activity/dangle Independent  Diet: Low fiber  Pain: Denies  Weiss/Voiding: Voiding  Tele/Restraints/Iso:NA  02/LDA: IV SL, RA  D/C Date:TBD  Other Info: No BM on shift, IV scheduled cipro and flagyl given.tolerating Fluids well

## 2025-06-21 NOTE — DISCHARGE SUMMARY
[facility]    Discharge Summary  Hospitalist    Date of Admission:  6/19/2025  Date of Discharge:  6/21/2025  Discharging Provider: Vivek Everett MD, MD    Discharge Diagnoses   Diverticulitis uncomplicated.  Bright red blood per rectum.  Resolved    History of Present Illness   Kimberly Salinas is a 55 year old male with past medical history of diverticulitis, hypertension, insomnia anxiety, hypothyroidism who presents for evaluation of rectal bleeding.     Patient was recently seen in the ED for similar symptoms of rectal bleeding/bright red blood in his stool and was diagnosed with diverticulitis.  Was discharged in stable condition of the ED with antibiotics and has been on a mostly liquid diet since then.  He reports that he was doing well and had not had any recurrent rectal bleeding until the afternoon today prior to admission.  He otherwise denies any abdominal pain.  He does endorse some slight discomfort with his bowel movements 2 days ago but for the most part is having regular bowel movements without any pain or blood before today.  He has no fevers or chills.  He has no nausea or vomiting.  Denies any recent hemorrhoids.  He denies any significant alcohol use since his discharge.  Denies any illicit drug use.  His last colonoscopy was roughly 2 to 3 years ago.  He otherwise has no urinary complaints of urgency or frequency, no dysuria or hematuria.  At this time the patient has no other complaints.       Hospital Course   Kimberly Salinas was admitted on 6/19/2025.  The following problems were addressed during his hospitalization:    Principal Problem:    Acute diverticulitis  Active Problems:    Rectal bleeding    Anxiety    HTN (hypertension)    Insomnia  Date of Admission:  6/19/2025     Assessment & Plan  Kimberly Salinas is a 55 year old male admitted on 6/19/2025. He presents with rectal bleeding.        Acute diverticulitis    Rectal bleeding    Assessment:  pt was  recently diagnosed with diverticulitis after rectal bleeding on 6/6 and recetly finished five days of antibiotics and reports he was feeling better. Today, started having rectal bleeding again at 1330.  Denies abdominal pain, nausea or vomiting. Hgb stable at 16.2, WBC 15.0 otherwise HD stable. No fevers or chills. Overall non septic appearing. CT ABDOMEN shows Mild acute, uncomplicated diverticulitis of the descending colon.      -clinically doing well.  Feeling better.  Vital signs normal.  No further bleeding.  Abdominal pain resolved.  Tolerating clear liquids then progression to high-fiber diet.  Afebrile.  Normal LFTs BMP.  White blood cell count normalized.  - Patient had no recurrent bleeding in the hospital.  No abdominal pain.  Tolerating diet.  -Treated with IV ciprofloxacin and IV Flagyl.  -Followed by colorectal surgery.  -Blood cell count on admission 15 down to 8.9.  LFTs BMP normal.    -Patient will discharge home with course of ciprofloxacin and Flagyl.  Follow-up with PCP 1 week.  Patient would like to follow-up with [facility] for repeat colonoscopy.  Colorectal surgery recommended to be done in 6 to 8 weeks.  Avoid NSAIDs as able.  High-fiber diet.          HTN (hypertension)    Assessment/Plan: not on anti-HTNs       Hypothyroidism    Assessment/Plan: continue PTA Synthroid       Insomnia     Anxiety    Assessment/Plan: stable, sleep aid as needed        Diet: High-fiber diet  DVT Prophylaxis: Pneumatic Compression Devices  Weiss Catheter: Not present  Lines: None     Cardiac Monitoring: None  Code Status:  FULL CODE     DVT Prophylaxis: Pneumatic Compression Devices  Code Status: Full Code     Disposition-Home    Vivek Everett MD, MD    Significant Results and Procedures   Hospital course    Pending Results   These results will be followed up by hospitalist  Unresulted Labs Ordered in the Past 30 Days of this Admission       Date and Time Order Name Status Description     6/19/2025 11:19 PM Blood Culture Peripheral blood (BC) Wrist, Right Preliminary     6/19/2025 11:19 PM Blood Culture Peripheral blood (BC) Arm, Left Preliminary             Code Status   Full Code       Primary Care Physician   Asad Vieira    Physical Exam   Temp: 98.4  F (36.9  C) Temp src: Oral BP: (!) 148/84 Pulse: 51   Resp: 16 SpO2: 93 % O2 Device: None (Room air)    Vitals:    06/20/25 0311 06/21/25 0700   Weight: 120.8 kg (266 lb 5.1 oz) 122.1 kg (269 lb 2.9 oz)     Vital Signs with Ranges  Temp:  [98.3  F (36.8  C)-98.4  F (36.9  C)] 98.4  F (36.9  C)  Pulse:  [51-55] 51  Resp:  [16] 16  BP: (124-148)/(64-84) 148/84  SpO2:  [93 %-97 %] 93 %  I/O last 3 completed shifts:  In: 2000 [P.O.:900; I.V.:1100]  Out: -   Constitutional: Up in bed, no acute distress, up ambulating in the room.  Respiratory:     Clear to auscultation bilaterally  Cardiovascular: Regular rate and rhythm no rubs gallops or murmurs appreciate  GI: Soft nontender, nondistended  Skin/Integumen: No rash or edema  Neurologic-speech mentation  Psychiatric, normal affect    Discharge Disposition   Discharged to home  Condition at discharge: Good    Consultations This Hospital Stay   COLORECTAL SURGERY IP CONSULT    Time Spent on this Encounter   I, Vivek Everett MD, personally saw the patient today and spent greater than 30 minutes discharging this patient.    Discharge Orders      Patient care order    Hold multivitamin for 6 days until you have completed course of antibiotics. Can interfere with ciprofloxicin antibiotic absorption     Patient care order    Avoid alcohol while on metronidazole/flagyl     Reason for your hospital stay    -diverticulitis  -rectal bleeding, from diverticulitis vs other source     Activity    Your activity upon discharge: activity as tolerated     Follow Up    Follow up with [facility] for colonoscopy in 6-8 weeks to evaluate diverticulitis and rectal bleeding.   See primary care doctor 1 week  "    Discharge Instructions    -high fiber diet  - complete course of antibiotics , next dose at bedtime tonight     Full Code     Diet    Follow this diet upon discharge: high fiber diet     Hospital Follow-up with Existing Primary Care Provider (PCP)          Discharge Medications   Current Discharge Medication List        START taking these medications    Details   ciprofloxacin (CIPRO) 500 MG tablet Take 1 tablet (500 mg) by mouth 2 times daily for 6 days.  Qty: 12 tablet, Refills: 0    Associated Diagnoses: Acute diverticulitis      metroNIDAZOLE (FLAGYL) 500 MG tablet Take 1 tablet (500 mg) by mouth 2 times daily for 6 days.  Qty: 12 tablet, Refills: 0    Associated Diagnoses: Acute diverticulitis      multivitamin w/minerals (MULTI-VITAMIN) tablet Take 1 tablet by mouth daily.    Associated Diagnoses: Acute diverticulitis           CONTINUE these medications which have NOT CHANGED    Details   Glucosamine HCl 1500 MG TABS Take 1,500 mg by mouth daily.      levothyroxine (SYNTHROID/LEVOTHROID) 200 MCG tablet Take 1 tablet (200 mcg) by mouth every morning (before breakfast)  Qty: 30 tablet, Refills: 1    Associated Diagnoses: Nontoxic multinodular goiter; Acute post-operative pain      losartan (COZAAR) 100 MG tablet Take 100 mg by mouth every evening.      vitamin D3 (CHOLECALCIFEROL) 50 mcg (2000 units) tablet Take 1 tablet by mouth daily.           STOP taking these medications       multivitamin w/minerals (MULTI-VITAMIN) tablet Comments:   Reason for Stopping:             Allergies   Allergies   Allergen Reactions    Penicillins      Per patient taken as a child and \"almost killed\" him.     Data   Most Recent 3 CBC's:  Recent Labs   Lab Test 06/20/25  0813 06/19/25 2044 06/06/25  1149   WBC 8.9 15.0* 16.6*   HGB 15.5 16.2 15.9   MCV 85 86 84    257 215      Most Recent 3 BMP's:  Recent Labs   Lab Test 06/20/25  0813 06/19/25 2044 06/06/25  1149    140 134*   POTASSIUM 4.0 4.1 4.1   CHLORIDE " 102 102 101   CO2 25 28 24   BUN 10.9 11.7 11.2   CR 0.89 0.94 0.86   ANIONGAP 11 10 9   MARYA 8.5* 9.0 8.7*   GLC 96 88 93     Most Recent 2 LFT's:  Recent Labs   Lab Test 06/20/25  0813 06/19/25 2044   AST 29 30   ALT 41 44   ALKPHOS 46 50   BILITOTAL 0.7 0.6     Most Recent INR's and Anticoagulation Dosing History:  Anticoagulation Dose History           No data to display              Most Recent 3 Troponin's:No lab results found.  Most Recent Cholesterol Panel:No lab results found.  Most Recent 6 Bacteria Isolates From Any Culture (See EPIC Reports for Culture Details):No lab results found.  Most Recent TSH, T4 and A1c Labs:No lab results found.  Results for orders placed or performed during the hospital encounter of 06/19/25   CT Abdomen Pelvis w Contrast    Narrative    EXAM: CT ABDOMEN PELVIS W CONTRAST  LOCATION: [facility]  DATE: 6/19/2025    INDICATION: recent diverticulitis, ongoing bleeding today  COMPARISON: CT from 6/6/2025  TECHNIQUE: CT scan of the abdomen and pelvis was performed following injection of IV contrast. Multiplanar reformats were obtained. Dose reduction techniques were used.  CONTRAST: 135 mL Isovue   370    FINDINGS:   LOWER CHEST: Normal.    HEPATOBILIARY: Small amount of focal fat along the falciform ligament. Normal gallbladder.    PANCREAS: Normal.    SPLEEN: Normal.    ADRENAL GLANDS: Normal.    KIDNEYS/BLADDER: Symmetric renal enhancement. No hydronephrosis. Bladder is normal.    BOWEL: No mechanical bowel obstruction. The appendix is normal. Colonic diverticulosis with mild wall thickening and pericolonic edema involving the descending colon. No free air    LYMPH NODES: Normal.    VASCULATURE: Normal.    PELVIC ORGANS: Normal.    MUSCULOSKELETAL: Severe degenerative disc height loss in the lumbar spine.      Impression    IMPRESSION:   1.  Mild acute, uncomplicated diverticulitis of the descending colon.

## 2025-06-23 LAB
BASOPHILS # BLD MANUAL: 0 10E3/UL (ref 0–0.2)
BASOPHILS NFR BLD MANUAL: 0 %
EOSINOPHIL # BLD MANUAL: 0.5 10E3/UL (ref 0–0.7)
EOSINOPHIL NFR BLD MANUAL: 3 %
ERYTHROCYTE [DISTWIDTH] IN BLOOD BY AUTOMATED COUNT: 14 % (ref 10–15)
HCT VFR BLD AUTO: 48 % (ref 40–53)
HGB BLD-MCNC: 16.2 G/DL (ref 13.3–17.7)
LYMPHOCYTES # BLD MANUAL: 7.1 10E3/UL (ref 0.8–5.3)
LYMPHOCYTES NFR BLD MANUAL: 47 %
MCH RBC QN AUTO: 28.9 PG (ref 26.5–33)
MCHC RBC AUTO-ENTMCNC: 33.8 G/DL (ref 31.5–36.5)
MCV RBC AUTO: 86 FL (ref 78–100)
MONOCYTES # BLD MANUAL: 1.4 10E3/UL (ref 0–1.3)
MONOCYTES NFR BLD MANUAL: 9 %
MYELOCYTES # BLD MANUAL: 0.2 10E3/UL
MYELOCYTES NFR BLD MANUAL: 1 %
NEUTROPHILS # BLD MANUAL: 6 10E3/UL (ref 1.6–8.3)
NEUTROPHILS NFR BLD MANUAL: 40 %
PATH REV: ABNORMAL
PLATELET # BLD AUTO: 257 10E3/UL (ref 150–450)
RBC # BLD AUTO: 5.61 10E6/UL (ref 4.4–5.9)
WBC # BLD AUTO: 15 10E3/UL (ref 4–11)

## 2025-06-24 NOTE — PROGRESS NOTES
Virtual Visit Details    Type of service:  Video Visit 7:31 AM -8:30 AM    Originating Location (pt. Location): Home    Distant Location (provider location):  Off-site  Platform used for Video Visit: [facility]            Outpatient Sleep Medicine Consultation:      Name: Kimberly Salinas MRN# [id number]   Age: 55 year old YOB: 1970     Date of Consultation: June 25, 2025  Consultation is requested by: Asad Vieira PA-C  [address] [address] Asad Vieira  Primary care provider: Asad Vieira       Reason for Sleep Consult:     Kimberly Salinas is sent by Asad Vieira for a sleep consultation regarding insomnia.    Patient s Reason for visit  Kimberly Salinas main reason for visit: (Patient-Rptd) Significant insomnia during a period of heightened anxiety.  As that cooled I realized that my usual sleep pattern often involved an hour or more of mid-night wakefullness.  Patient states problem(s) started: (Patient-Rptd) Sleep has been irregular for at least a decade.  Kimberly D Rita's goals for this visit: (Patient-Rptd) Understand what of my sleep pattern is within a normal range for an aging person, and what might warrant further investigation.           Assessment and Plan:     Impression/Plan:    ICD-10-CM    1. Suspected sleep apnea  R29.818 HST - Home Sleep Apnea Test - Noxturnal, T-3 Returnable      2. Psychophysiological insomnia  F51.04 Adult Sleep Eval & Management  Referral     HST - Home Sleep Apnea Test - Noxturnal, T-3 Returnable      3. Anxiety  F41.9 HST - Home Sleep Apnea Test - Noxturnal, T-3 Returnable      4. Primary hypertension  I10 HST - Home Sleep Apnea Test - Noxturnal, T-3 Returnable      5. Class 2 obesity due to excess calories without serious comorbidity with body mass index (BMI) of 35.0 to 35.9 in adult  E66.812 HST - Home Sleep Apnea Test - Noxturnal, T-3 Returnable    E66.09     Z68.35           Plans for Kimberly GAR  Rita; includes:  Home sleep study per her request.  Kimberly has a STOP-BANG score of 6/8 with positives for snoring, blood pressure, BMI, age, neck circumference, and gender.  They have comorbidities of psychophysiological insomnia, anxiety, hypertension, and class II obesity.  This places them at a higher pretest probability for sleep apnea.  CO2 levels have ranged from 24-28 this year.  They are prediabetic.  Once sleep apnea has been ruled in or out, we will consider referral to behavioral sleep medicine.  Sleep apnea will need to be optimized prior.  I have asked Kimberly once they complete their sleep study to please notify me with a UserMojo message so we may collaborate, once sleep study results become available and decide the next steps in their plan of care.  Recommend a return visit to the sleep medicine clinic in 10-12 weeks after completion of their sleep study.  - In addition, recommend patient optimize their sleep schedule as well as sleep hygiene practices to mitigate any further sleep disruption.  - Recommend they employ safe driving practices such as not driving motor vehicle should they become drowsy.  - Recommend weight management to a BMI of 30.0 or less.    62 minutes spent with patient, all of which were spent face-to-face counseling, consulting, coordinating plan of care.  The longitudinal plan of care for the diagnosis(es)/condition(s) as documented were addressed during this visit. Due to the added complexity in care, I will continue to support Kimberly in the subsequent management and with ongoing continuity of care.    EVERARDO Pearce CNP         History of Present Illness:     Kimberly Salinas presents to the sleep medicine clinic with concerns of anxiety interfering with sleep.    Kimberly Salinas has a medical history which includes diverticulitis, anxiety, primary hypertension, psychophysiological insomnia, hypothyroidism, and class II obesity.    Blood pressure not in good  "control, however when he checks his blood pressure at home it is much more stable.   Losartan 100 mg by mouth every evening    Enjoys physical activity.  Has lost over 100 pounds.  Would like to lose more.    Anxiety d/t political situation which occasionally interferes with sleep    Irregular sleep for probably a decade.    Does attempt to return to sleep with meditation, deep breathing, reading fiction    May nap for 15-45 minutes, does feel better after naps.  Does not passively nap and has not had no motor vehicle mishaps.    Does snore, multiple nocturnal awakenings for elimination needs, suffers from pain at night which awakens them, as well as a depressed mood.    No results found for: \"A1C\"     BP Readings from Last 6 Encounters:   06/21/25 (!) 148/84   06/06/25 (!) 164/98   02/03/21 137/86      Wt Readings from Last 10 Encounters:   06/26/25 119.3 kg (263 lb)   06/21/25 122.1 kg (269 lb 2.9 oz)   02/02/21 123.9 kg (273 lb 1.6 oz)   01/05/21 117.9 kg (260 lb)        Tonsils: Out    Neck Circumference: 19.5\"    [place] Sleepiness Scale  Total score - [place]:4   (Less than 10 normal)     Insomnia Severity Scale  DIAMOND Total Score: 11  (normal 0-7, mild 8-14, moderate 15-21, severe 22-28)    STOP-BANG score 6/8 which places them at a high pretest probability for sleep apnea.  Discussed basic pathophysiology of central sleep apnea as well as for obstructive sleep apnea.  Discussed implications of untreated sleep apnea in terms of medical diagnosis as well as mental health.  Reviewed potential treatments for sleep apnea with emphasis on PAP therapy.  Discussed sleep testing process.    Social History:  Counterintelligence  Committed relationship    Past Sleep Evaluations:  None    SLEEP-WAKE SCHEDULE:     Work/School Days: Patient goes to school/work: (Patient-Rptd) Yes   Usually gets into bed at (Patient-Rptd) 9:30 - 10 PM  Takes patient about (Patient-Rptd) I often read in bed, after doing so usually less than 5 " minutes. to fall asleep  Has trouble falling asleep (Patient-Rptd) 1 nights per week  Wakes up in the middle of the night (Patient-Rptd) 1-3 times.  Wakes up due to (Patient-Rptd) External stimuli (bed partner, pets, noise, etc);Use the bathroom;Anxiety;Uncertain partner is a night owl. Sometimes they wake patient.   He has trouble falling back asleep (Patient-Rptd) 3-5 times a week. Tries to make themselves comfortable  It usually takes (Patient-Rptd) 30 to 90 minutes to get back to sleep Anxiety. Racing thoughts. Meditation.   Patient is usually up at (Patient-Rptd) 6-7 AM  Uses alarm: (Patient-Rptd) No    Weekends/Non-work Days/All Other Days:  Usually gets into bed at (Patient-Rptd) same   Takes patient about (Patient-Rptd) same to fall asleep  Patient is usually up at (Patient-Rptd) same  Uses alarm: (Patient-Rptd) No    Sleep Need  Patient gets  (Patient-Rptd) 6-7 hours sleep on average    Patient thinks he needs about (Patient-Rptd) 7-8 hours (I was retired for about four months last year, and had a very clean sleep pattern that gave me this amount and I popped out of bed in the morning) sleep    Kimberly Ortizhl prefers to sleep in this position(s): (Patient-Rptd) Side   Patient states they do the following activities in bed: (Patient-Rptd) Read;Use phone, computer, or tablet    Naps  Patient takes a purposeful nap (Patient-Rptd) 2 days -- learning to do this when disruptively tired, very productive times a week and naps are usually (Patient-Rptd) 15-45 minutes in duration  He feels better after a nap: (Patient-Rptd) Yes  He dozes off unintentionally (Patient-Rptd) 0 days per week   Patient has had a driving accident or near-miss due to sleepiness/drowsiness: (Patient-Rptd) No    May doze when watching TV.       SLEEP DISRUPTIONS:    Breathing/Snoring  Patient snores:(Patient-Rptd) Yes  Other people complain about his snoring: (Patient-Rptd) No  Patient has been told he stops breathing in his  sleep:(Patient-Rptd) No  He has issues with the following: (Patient-Rptd) Getting up to urinate more than once.    Movement:  Patient gets pain, discomfort, with an urge to move:  (Patient-Rptd) No restless legs symptoms  It happens when he is resting:  (Patient-Rptd) No  It happens more at night:     Patient has been told he kicks his legs at night:  (Patient-Rptd) No     Behaviors in Sleep:  Kimberly Salinas has experienced the following behaviors while sleeping:    He has experienced sudden muscle weakness during the day: (Patient-Rptd) No  Pt denies bruxism, sleep talking, sleep walking, and dream enactment behavior. Pt denies sleep paralysis, hypnagogue and cataplexy.       Is there anything else you would like your sleep provider to know: (Patient-Rptd) While retired last year I briefly had a period of ideal sleep -- go to be, sleep 7-8 hours, wake up immediately fully awake.  This was novel in adulthood and made me realize I should pay attention and try to understand what my bod is saying      CAFFEINE AND OTHER SUBSTANCES:    Patient consumes caffeinated beverages per day:  (Patient-Rptd) 3 cups of coffee max  Last caffeine use is usually: (Patient-Rptd) 3pm  List of any prescribed or over the counter stimulants that patient takes:    List of any prescribed or over the counter sleep medication patient takes:    List of previous sleep medications that patient has tried:    Patient drinks alcohol to help them sleep: (Patient-Rptd) No  Patient drinks alcohol near bedtime: (Patient-Rptd) No    Family History:  Patient has a family member been diagnosed with a sleep disorder: (Patient-Rptd) No      Father snores loudly      SCALES:    EPWORTH SLEEPINESS SCALE         6/21/2025    12:55 PM    [place] Sleepiness Scale ( EVERTON Azar  [id number]<br>ESS - [place]/English - Final version - 21 Nov 07 - [facility] [employer].)   Sitting and reading Slight chance of dozing   Watching TV Moderate chance of dozing   Sitting,  "inactive in a public place (e.g. a theatre or a meeting) Would never doze   As a passenger in a car for an hour without a break Would never doze   Lying down to rest in the afternoon when circumstances permit Slight chance of dozing   Sitting and talking to someone Would never doze   Sitting quietly after a lunch without alcohol Would never doze   In a car, while stopped for a few minutes in traffic Would never doze   Carriere Score (MC) 4   Carriere Score (Sleep) 4        Patient-reported         INSOMNIA SEVERITY INDEX (DIAMOND)          6/21/2025    12:40 PM   Insomnia Severity Index (DIAMOND)   Difficulty falling asleep 1   Difficulty staying asleep 2   Problems waking up too early 2   How SATISFIED/DISSATISFIED are you with your CURRENT sleep pattern? 2   How NOTICEABLE to others do you think your sleep problem is in terms of impairing the quality of your life? 1   How WORRIED/DISTRESSED are you about your current sleep problem? 2   To what extent do you consider your sleep problem to INTERFERE with your daily functioning (e.g. daytime fatigue, mood, ability to function at work/daily chores, concentration, memory, mood, etc.) CURRENTLY? 1   DIAMOND Total Score 11        Patient-reported       Guidelines for Scoring/Interpretation:  Total score categories:  0-7 = No clinically significant insomnia   8-14 = Subthreshold insomnia   15-21 = Clinical insomnia (moderate severity)  22-28 = Clinical insomnia (severe)  Used via courtesy of www.playnikealth.va.gov with permission from Ashwin Macias PhD., [facility]      STOP BANG           6/26/2025     7:16 AM   STOP BANG Questionnaire (  2008, the American Society of Anesthesiologists, Inc. [employer]   B/P Clinic: --   BMI Clinic: 34.7         GAD7         No data to display                  CAGE-AID         No data to display                CAGE-AID reprinted with permission from the [place] Medical Journal, MARIA ELENA Sagastume. and CHERYL Blackwood, \"Conjoint " "screening questionnaires for alcohol and drug abuse\" [place]@U Journal 94: 135-140, 1995.      PATIENT HEALTH QUESTIONNAIRE-9 (PHQ - 9)         No data to display                Developed by Tammie Rios, Maria Alejandra Bejarano, Moses Ybarra and colleagues, with an educational barbie from [employer] No permission required to reproduce, translate, display or distribute.        Allergies:    Allergies   Allergen Reactions    Penicillins      Per patient taken as a child and \"almost killed\" him.       Medications:    Current Outpatient Medications   Medication Sig Dispense Refill    ciprofloxacin (CIPRO) 500 MG tablet Take 1 tablet (500 mg) by mouth 2 times daily for 6 days. 12 tablet 0    Glucosamine HCl 1500 MG TABS Take 1,500 mg by mouth daily.      levothyroxine (SYNTHROID/LEVOTHROID) 200 MCG tablet Take 1 tablet (200 mcg) by mouth every morning (before breakfast) 30 tablet 1    losartan (COZAAR) 100 MG tablet Take 100 mg by mouth every evening.      metroNIDAZOLE (FLAGYL) 500 MG tablet Take 1 tablet (500 mg) by mouth 2 times daily for 6 days. 12 tablet 0    [START ON 6/27/2025] multivitamin w/minerals (MULTI-VITAMIN) tablet Take 1 tablet by mouth daily.      vitamin D3 (CHOLECALCIFEROL) 50 mcg (2000 units) tablet Take 1 tablet by mouth daily.         Problem List:  Patient Active Problem List    Diagnosis Date Noted    Rectal bleeding 06/19/2025     Priority: Medium    Acute diverticulitis 06/19/2025     Priority: Medium    Anxiety 06/19/2025     Priority: Medium    HTN (hypertension) 06/19/2025     Priority: Medium    Insomnia 06/19/2025     Priority: Medium        Past Medical/Surgical History:  Past Medical History:   Diagnosis Date    Enlargement of thyroid     Kidney stones      Past Surgical History:   Procedure Laterality Date    ENT SURGERY      wisdom teeth removal    neck lumpectomy      benign    ORTHOPEDIC SURGERY      ORIF left wrist    THYROIDECTOMY Bilateral 2/2/2021    Procedure: " TOTAL THYROIDECTOMY;  Surgeon: Asad Phillip MD;  Location:  OR       Social History:  Social History     Socioeconomic History    Marital status:      Spouse name: Not on file    Number of children: Not on file    Years of education: Not on file    Highest education level: Not on file   Occupational History    Not on file   Tobacco Use    Smoking status: Never    Smokeless tobacco: Never   Substance and Sexual Activity    Alcohol use: Yes     Comment: 2-4 times a week    Drug use: Not Currently    Sexual activity: Not on file   Other Topics Concern    Parent/sibling w/ CABG, MI or angioplasty before 65F 55M? Not Asked   Social History Narrative    Not on file     Social Drivers of Health     Financial Resource Strain: Low Risk  (6/20/2025)    Financial Resource Strain     Within the past 12 months, have you or your family members you live with been unable to get utilities (heat, electricity) when it was really needed?: No   Food Insecurity: Low Risk  (6/20/2025)    Food Insecurity     Within the past 12 months, did you worry that your food would run out before you got money to buy more?: No     Within the past 12 months, did the food you bought just not last and you didn t have money to get more?: No   Transportation Needs: Low Risk  (6/20/2025)    Transportation Needs     Within the past 12 months, has lack of transportation kept you from medical appointments, getting your medicines, non-medical meetings or appointments, work, or from getting things that you need?: No   Physical Activity: Not on file   Stress: Not on file   Social Connections: Not on file   Interpersonal Safety: Low Risk  (6/20/2025)    Interpersonal Safety     Do you feel physically and emotionally safe where you currently live?: Yes     Within the past 12 months, have you been hit, slapped, kicked or otherwise physically hurt by someone?: No     Within the past 12 months, have you been humiliated or emotionally abused in other  "ways by your partner or ex-partner?: No   Housing Stability: Low Risk  (6/20/2025)    Housing Stability     Do you have housing? : Yes     Are you worried about losing your housing?: No       Family History:  Family History   Problem Relation Age of Onset    No Known Problems Mother     No Known Problems Father        Review of Systems:  A complete review of systems reviewed by me is negative with the exeption of what has been mentioned in the history of present illness.  In the last TWO WEEKS have you experienced any of the following symptoms?  Fevers: (Patient-Rptd) No  Night Sweats: (Patient-Rptd) No  Weight Gain: (Patient-Rptd) No  Pain at Night: (Patient-Rptd) Yes  Double Vision: (Patient-Rptd) No  Changes in Vision: (Patient-Rptd) No  Difficulty Breathing through Nose: (Patient-Rptd) No  Sore Throat in Morning: (Patient-Rptd) No  Dry Mouth in the Morning: (Patient-Rptd) No  Shortness of Breath Lying Flat: (Patient-Rptd) No  Shortness of Breath With Activity: (Patient-Rptd) No  Awakening with Shortness of Breath: (Patient-Rptd) No  Increased Cough: (Patient-Rptd) No  Heart Racing at Night: (Patient-Rptd) No  Swelling in Feet or Legs: (Patient-Rptd) No  Diarrhea at Night: (Patient-Rptd) No  Heartburn at Night: (Patient-Rptd) No  Urinating More than Once at Night: (Patient-Rptd) Yes  Losing Control of Urine at Night: (Patient-Rptd) No  Joint Pains at Night: (Patient-Rptd) Yes  Headaches in Morning: (Patient-Rptd) No  Weakness in Arms or Legs: (Patient-Rptd) No  Depressed Mood: (Patient-Rptd) Yes  Anxiety: (Patient-Rptd) No     Physical Examination:  Vitals: Pulse 53   Ht 1.854 m (6' 1\")   Wt 119.3 kg (263 lb)   BMI 34.70 kg/m    BMI= Body mass index is 34.7 kg/m .         Physical Exam  Vitals and nursing note reviewed.   Constitutional:       General: He is awake.      Appearance: Normal appearance. He is well-developed and well-groomed. He is morbidly obese.   HENT:      Head: Normocephalic and atraumatic. " "     Right Ear: External ear normal.      Left Ear: External ear normal.      Nose: Nose normal.      Mouth/Throat:      Mouth: Mucous membranes are moist.      Pharynx: Oropharynx is clear.   Eyes:      Conjunctiva/sclera: Conjunctivae normal.   Pulmonary:      Effort: Pulmonary effort is normal.   Musculoskeletal:      Cervical back: Normal range of motion and neck supple.   Neurological:      General: No focal deficit present.      Mental Status: He is alert and oriented to person, place, and time.   Psychiatric:         Mood and Affect: Mood normal.         Behavior: Behavior normal. Behavior is cooperative.         Thought Content: Thought content normal.         Judgment: Judgment normal.          Physical examination is limited by the nature of a virtual visit      All Labs Personally Reviewed             Data: All pertinent previous laboratory data reviewed     Recent Labs   Lab Test 06/20/25  0813 06/19/25  2044    140   POTASSIUM 4.0 4.1   CHLORIDE 102 102   CO2 25 28   ANIONGAP 11 10   GLC 96 88   BUN 10.9 11.7   CR 0.89 0.94   MARYA 8.5* 9.0       Recent Labs   Lab Test 06/20/25  0813   WBC 8.9   RBC 5.34   HGB 15.5   HCT 45.6   MCV 85   MCH 29.0   MCHC 34.0   RDW 14.2          Recent Labs   Lab Test 06/20/25  0813   PROTTOTAL 6.1*   ALBUMIN 3.8   BILITOTAL 0.7   ALKPHOS 46   AST 29   ALT 41       No results found for: \"TSH\"    No results found for: \"UAMP\", \"UBARB\", \"BENZODIAZEUR\", \"UCANN\", \"UCOC\", \"OPIT\", \"UPCP\"    No results found for: \"IRONSAT\", \"DO09902\", \"MAX\"    No results found for: \"PH\", \"PHARTERIAL\", \"PO2\", \"FK5BZUBUULY\", \"SAT\", \"PCO2\", \"HCO3\", \"BASEEXCESS\", \"BRANDON\", \"BEB\"    @LABRCNTIPR(phv:4,pco2v:4,po2v:4,hco3v:4,eliel:4,o2per:4)@    Echocardiology:         Chest x-ray: No results found for this or any previous visit from the past 365 days.      Chest CT: No results found for this or any previous visit from the past 365 days.      PFT: Most Recent [place]ze Pulmonary Function " Testing        Capri Braga, APRN CNP 6/24/2025   Sleep Medicine

## 2025-06-25 LAB
BACTERIA SPEC CULT: NO GROWTH
BACTERIA SPEC CULT: NO GROWTH

## 2025-06-26 ENCOUNTER — VIRTUAL VISIT (OUTPATIENT)
Dept: SLEEP MEDICINE | Facility: CLINIC | Age: 55
End: 2025-06-26
Payer: COMMERCIAL

## 2025-06-26 VITALS — BODY MASS INDEX: 34.85 KG/M2 | WEIGHT: 263 LBS | HEART RATE: 53 BPM | HEIGHT: 73 IN

## 2025-06-26 DIAGNOSIS — F51.04 PSYCHOPHYSIOLOGICAL INSOMNIA: ICD-10-CM

## 2025-06-26 DIAGNOSIS — E66.09 CLASS 2 OBESITY DUE TO EXCESS CALORIES WITHOUT SERIOUS COMORBIDITY WITH BODY MASS INDEX (BMI) OF 35.0 TO 35.9 IN ADULT: ICD-10-CM

## 2025-06-26 DIAGNOSIS — I10 PRIMARY HYPERTENSION: ICD-10-CM

## 2025-06-26 DIAGNOSIS — E66.812 CLASS 2 OBESITY DUE TO EXCESS CALORIES WITHOUT SERIOUS COMORBIDITY WITH BODY MASS INDEX (BMI) OF 35.0 TO 35.9 IN ADULT: ICD-10-CM

## 2025-06-26 DIAGNOSIS — R29.818 SUSPECTED SLEEP APNEA: Primary | ICD-10-CM

## 2025-06-26 DIAGNOSIS — F41.9 ANXIETY: ICD-10-CM

## 2025-06-26 PROBLEM — M75.122 COMPLETE ROTATOR CUFF TEAR OF LEFT SHOULDER: Status: ACTIVE | Noted: 2025-05-31

## 2025-06-26 PROCEDURE — 1125F AMNT PAIN NOTED PAIN PRSNT: CPT | Mod: 95 | Performed by: NURSE PRACTITIONER

## 2025-06-26 PROCEDURE — 98003 SYNCH AUDIO-VIDEO NEW HI 60: CPT | Performed by: NURSE PRACTITIONER

## 2025-06-26 ASSESSMENT — PAIN SCALES - GENERAL: PAINLEVEL_OUTOF10: MILD PAIN (2)

## 2025-06-26 NOTE — PATIENT INSTRUCTIONS
"          MY TREATMENT INFORMATION FOR SLEEP APNEA-  Kimberly Salinas    DOCTOR : EVERARDO Pearce CNP    Am I having a sleep study at a sleep center?  --->Due to normal delays, you will be contacted within 2-4 weeks to schedule    Am I having a home sleep study?  --->Watch the video for the device you are using:    -/drop off device-   https://www.Linguastat.com/watch?v=yGGFBdELGhk    -Disposable device sent out require phone/computer application-   https://www.Linguastat.com/watch?v=BCce_vbiwxE      Frequently asked questions:  1. What is Obstructive Sleep Apnea (RAMAKRISHNA)? RAMAKRISHNA is the most common type of sleep apnea. Apnea means, \"without breath.\"  Apnea is most often caused by narrowing or collapse of the upper airway as muscles relax during sleep.   Almost everyone has occasional apneas. Most people with sleep apnea have had brief interruptions at night frequently for many years.  The severity of sleep apnea is related to how frequent and severe the events are.   2. What are the consequences of RAMAKRISHNA? Symptoms include: feeling sleepy during the day, snoring loudly, gasping or stopping of breathing, trouble sleeping, and occasionally morning headaches or heartburn at night.  Sleepiness can be serious and even increase the risk of falling asleep while driving. Other health consequences may include development of high blood pressure and other cardiovascular disease in persons who are susceptible. Untreated RAMAKRISHNA  can contribute to heart disease, stroke and diabetes.   3. What are the treatment options? In most situations, sleep apnea is a lifelong disease that must be managed with daily therapy. Medications are not effective for sleep apnea and surgery is generally not considered until other therapies have been tried. Your treatment is your choice . Continuous Positive Airway (CPAP) works right away and is the therapy that is effective in nearly everyone. An oral device to hold your jaw forward is usually the next " most reliable option. Other options include postioning devices (to keep you off your back), weight loss, and surgery including a tongue pacing device. There is more detail about some of these options below.  4. Are my sleep studies covered by insurance? Although we will request verification of coverage, we advise you also check in advance of the study to ensure there is coverage.    Important tips for those choosing CPAP and similar devices  REMEMBER-IF YOU RECEIVE A CALL FROM  [phone]-->IT IS TO SETUP A DEVICE  For new devices, sign up for device KATIE to monitor your device for your followup visits  We encourage you to utilize the Rex[employer] katie or website ( [email or web address].[employer]/ ) to monitor your therapy progress and share the data with your healthcare team when you discuss your sleep apnea.                                                    Know your equipment:  CPAP is continuous positive airway pressure that prevents obstructive sleep apnea by keeping the throat from collapsing while you are sleeping. In most cases, the device is  smart  and can slowly self-adjusts if your throat collapses and keeps a record every day of how well you are treated-this information is available to you and your care team.  BPAP is bilevel positive airway pressure that keeps your throat open and also assists each breath with a pressure boost to maintain adequate breathing.  Special kinds of BPAP are used in patients who have inadequate breathing from lung or heart disease. In most cases, the device is  smart  and can slowly self-adjusts to assist breathing. Like CPAP, the device keeps a record of how well you are treated.  Your mask is your connection to the device. You get to choose what feels most comfortable and the staff will help to make sure if fits. Here: are some examples of the different masks that are available: Magnetic mask aids may assist with use but there are safety issues that should be addressed when  considering with magnets* ( see end of discussion).       Key points to remember on your journey with sleep apnea:  Sleep study.  PAP devices often need to be adjusted during a sleep study to show that they are effective and adjusted right.  Good tips to remember: Try wearing just the mask during a quiet time during the day so your body adapts to wearing it. A humidifier is recommended for comfort in most cases to prevent drying of your nose and throat. Allergy medication from your provider may help you if you are having nasal congestion.  Getting settled-in. It takes more than one night for most of us to get used to wearing a mask. Try wearing just the mask during a quiet time during the day so your body adapts to wearing it. A humidifier is recommended for comfort in most cases. Our team will work with you carefully on the first day and will be in contact within 4 days and again at 2 and 4 weeks for advice and remote device adjustments. Your therapy is evaluated by the device each day.   Use it every night. The more you are able to sleep naturally for 7-8 hours, the more likely you will have good sleep and to prevent health risks or symptoms from sleep apnea. Even if you use it 4 hours it helps. Occasionally all of us are unable to use a medical therapy, in sleep apnea, it is not dangerous to miss one night.   Communicate. Call our skilled team on the number provided on the first day if your visit for problems that make it difficult to wear the device. Over 2 out of 3 patients can learn to wear the device long-term with help from our team. Remember to call our team or your sleep providers if you are unable to wear the device as we may have other solutions for those who cannot adapt to mask CPAP therapy. It is recommended that you sleep your sleep provider within the first 3 months and yearly after that if you are not having problems.   Use it for your health. We encourage use of CPAP masks during daytime quiet  periods to allow your face and brain to adapt to the sensation of CPAP so that it will be a more natural sensation to awaken to at night or during naps. This can be very useful during the first few weeks or months of adapting to CPAP though it does not help medically to wear CPAP during wakefulness and  should not be used as a strategy just to meet guidelines.  Take care of your equipment. Make sure you clean your mask and tubing using directions every day and that your filter and mask are replaced as recommended or if they are not working.     *Masks with magnets:  Updated Contraindications  Masks with magnetic components are contraindicated for use by patients where they, or anyone in close physical contact while using the mask, have the following:   Active medical implants that interact with magnets (i.e., pacemakers, implantable cardioverter defibrillators (ICD), neurostimulators, cerebrospinal fluid (CSF) shunts, insulin/infusion pumps)   Metallic implants/objects containing ferromagnetic material (i.e., aneurysm clips/flow disruption devices, embolic coils, stents, valves, electrodes, implants to restore hearing or balance with implanted magnets, ocular implants, metallic splinters in the eye)  Updated Warning  Keep the mask magnets at a safe distance of at least 6 inches (150 mm) away from implants or medical devices that may be adversely affected by magnetic interference. This warning applies to you or anyone in close physical contact with your mask. The magnets are in the frame and lower headgear clips, with a magnetic field strength of up to 400mT. When worn, they connect to secure the mask but may inadvertently detach while asleep.  Implants/medical devices, including those listed within contraindications, may be adversely affected if they change function under external magnetic fields or contain ferromagnetic materials that attract/repel to magnetic fields (some metallic implants, e.g., contact lenses  with metal, dental implants, metallic cranial plates, screws, rodney hole covers, and bone substitute devices). Consult your physician and  of your implant / other medical device for information on the potential adverse effects of magnetic fields.    BESIDES CPAP, WHAT OTHER THERAPIES ARE THERE?    Positioning Device  Positioning devices are generally used when sleep apnea is mild and only occurs on your back.This example shows a pillow that straps around the waist. It may be appropriate for those whose sleep study shows milder sleep apnea that occurs primarily when lying flat on one's back. Preliminary studies have shown benefit but effectiveness at home may need to be verified by a home sleep test. These devices are generally not covered by medical insurance.  Examples of devices that maintain sleeping on the back to prevent snoring and mild sleep apnea.    Belt type body positioner  http://[employer]/    Electronic reminder  http://nightshifttherapy.[employer]/            Oral Appliance  What is oral appliance therapy?  An oral appliance device fits on your teeth at night like a retainer used after having braces. The device is made by a specialized dentist and requires several visits over 1-2 months before a manufactured device is made to fit your teeth and is adjusted to prevent your sleep apnea. Once an oral device is working properly, snoring should be improved. A home sleep test may be recommended at that time if to determine whether the sleep apnea is adequately treated.       Some things to remember:  -Oral devices are often, but not always, covered by your medical insurance. Be sure to check with your insurance provider.   -If you are referred for oral therapy, you will be given a list of specialized dentists to consider or you may choose to visit the Web site of the American Academy of Dental Sleep Medicine  -Oral devices are less likely to work if you have severe sleep apnea or are extremely  overweight.     More detailed information  An oral appliance is a small acrylic device that fits over the upper and lower teeth  (similar to a retainer or a mouth guard). This device slightly moves jaw forward, which moves the base of the tongue forward, opens the airway, improves breathing for effective treat snoring and obstructive sleep apnea in perhaps 7 out of 10 people .  The best working devices are custom-made by a dental device  after a mold is made of the teeth 1, 2, 3.  When is an oral appliance indicated?  Oral appliance therapy is recommended as a first-line treatment for patients with primary snoring, mild sleep apnea, and for patients with moderate sleep apnea who prefer appliance therapy to use of CPAP4, 5. Severity of sleep apnea is determined by sleep testing and is based on the number of respiratory events per hour of sleep.   How successful is oral appliance therapy?  The success rate of oral appliance therapy in patients with mild sleep apnea is 75-80% while in patients with moderate sleep apnea it is 50-70%. The chance of success in patients with severe sleep apnea is 40-50%. The research also shows that oral appliances have a beneficial effect on the cardiovascular health of RAMAKRISHNA patients at the same magnitude as CPAP therapy7.  Oral appliances should be a second-line treatment in cases of severe sleep apnea, but if not completely successful then a combination therapy utilizing CPAP plus oral appliance therapy may be effective. Oral appliances tend to be effective in a broad range of patients although studies show that the patients who have the highest success are females, younger patients, those with milder disease, and less severe obesity. 3, 6.   Finding a dentist that practices dental sleep medicine  Specific training is available through the American Academy of Dental Sleep Medicine for dentists interested in working in the field of sleep. To find a dentist who is educated in  the field of sleep and the use of oral appliances, near you, visit the Web site of the American Academy of Dental Sleep Medicine.    References  1. Indu, et al. Objectively measured vs self-reported compliance during oral appliance therapy for sleep-disordered breathing. Chest 2013; 144(5): 1451-8031.  2. Masood et al. Objective measurement of compliance during oral appliance therapy for sleep-disordered breathing. Thorax 2013; 68(1): 91-96.  3. Gloria et al. Mandibular advancement devices in 620 men and women with RAMAKRISHNA and snoring: tolerability and predictors of treatment success. Chest 2004; 125: 4058-3037.  4. Joan, et al. Oral appliances for snoring and RAMAKRISHNA: a review. Sleep 2006; 29: 244-262.  5. Shirley et al. Oral appliance treatment for RAMAKRISHNA: an update. J Clin Sleep Med 2014; 10(2): 215-227.  6. Serge et al. Predictors of OSAH treatment outcome. J Dent Res 2007; 86: 8451-8200.      Weight Loss:   Your Body mass index is 34.7 kg/m .    Being overweight does not necessarily mean you will have health consequences.  Those who have BMI over 30 or over 27 with existing medical conditions carries greater risk.   Weight loss decreases severity of sleep apnea in most people with obesity. For those with mild obesity who have developed snoring with weight gain, even 15-30 pound weight loss can improve and occasionally milder eliminate sleep apnea.  Structured and life-long dietary and health habits are necessary to lose weight and keep healthier weight levels.     The Comprehensive Weight loss program offers all aspects of weight loss strategies including two Non-Surgical Weight Loss Programs: Medical Weight Management and our 24 Week Healthy Lifestyle Program:  Medical Weight Management: You will meet with a Medical Weight Management Provider, as well as a Registered Dietician. The program may include medication therapy, dietary education, recommended exercise and physical therapy programs,  monthly support group meetings, and possible psychological counseling. Follow up visits with the provider or dietician are scheduled based on your progress and needs.  24 Week Healthy Lifestyle Program: This unique program is designed to give you the support of weekly appointments and activities thru a 24-week period. It may include all of the components of the basic program (above), with the addition of 11 individual Health  Visits, 24-week access to the [employer] website for over 700 online classes, and monthly support group meetings. This program has an out-of-pocket expense of $499 to cover the items that can not be billed to insurance (health coaches and [employer] access), and is non-refundable/non-transferable (you may be able to use a Health Savings Account; ask your HSA provider). There may be an optional meal replacement plan prescribed as well.   Medication therapy has been approved for the treatment of sleep apnea: The FDA approved tirzepatide (ZEPBOUND) for moderate to severe sleep apnea (apnea-hypopnea index greater than or equal to 15) in patients with BMI of greater than or equal to 30, or BMI greater than or equal to 27 with at least 1 weight-related condition such as hypertension or dyslipidemia.  Surgical management achieves meaningful long-term weight loss and improvement in health risks in most patients with more severe obesity.      Sleep Apnea Surgery:    Surgery for obstructive sleep apnea is considered generally only when other therapies fail to work. Surgery may be discussed with you if you are having a difficult time tolerating CPAP and or when there is an abnormal structure that requires surgical correction.  Nose and throat surgeries often enlarge the airway to prevent collapse.  Most of these surgeries create pain for 1-2 weeks and up to half of the most common surgeries are not effective throughout life.  You should carefully discuss the benefits and drawbacks to surgery with your  sleep provider and surgeon to determine if it is the best solution for you.   More information  Surgery for RAMAKRISHNA is directed at areas that are responsible for narrowing or complete obstruction of the airway during sleep.  There are a wide range of procedures available to enlarge and/or stabilize the airway to prevent blockage of breathing in the three major areas where it can occur: the palate, tongue, and nasal regions.  Successful surgical treatment depends on the accurate identification of the factors responsible for obstructive sleep apnea in each person.  A personalized approach is required because there is no single treatment that works well for everyone.  Because of anatomic variation, consultation with an examination by a sleep surgeon is a critical first step in determining what surgical options are best for each patient.  In some cases, examination during sedation may be recommended in order to guide the selection of procedures.  Patients will be counseled about risks and benefits as well as the typical recovery course after surgery. Surgery is typically not a cure for a person s RAMAKRISHNA.  However, surgery will often significantly improve one s RAMAKRISHNA severity (termed  success rate ).  Even in the absence of a cure, surgery will decrease the cardiovascular risk associated with OSA7; improve overall quality of life8 (sleepiness, functionality, sleep quality, etc).      Palate Procedures:  Patients with RAMAKRISHNA often have narrowing of their airway in the region of their tonsils and uvula.  The goals of palate procedures are to widen the airway in this region as well as to help the tissues resist collapse.  Modern palate procedure techniques focus on tissue conservation and soft tissue rearrangement, rather than tissue removal.  Often the uvula is preserved in this procedure. Residual sleep apnea is common in patient after pharyngoplasty with an average reduction in sleep apnea events of 33%2.      Tongue  Procedures:  ExamWhile patients are awake, the muscles that surround the throat are active and keep this region open for breathing. These muscles relax during sleep, allowing the tongue and other structures to collapse and block breathing.  There are several different tongue procedures available.  Selection of a tongue base procedure depends on characteristics seen on physical exam.  Generally, procedures are aimed at removing bulky tissues in this area or preventing the back of the tongue from falling back during sleep.  Success rates for tongue surgery range from 50-62%3.    Hypoglossal Nerve Stimulation:  Hypoglossal nerve stimulation has recently received approval from the [place] Food and Drug Administration for the treatment of obstructive sleep apnea.  This is based on research showing that the system was safe and effective in treating sleep apnea6.  Results showed that the median AHI score decreased 68%, from 29.3 to 9.0. This therapy uses an implant system that senses breathing patterns and delivers mild stimulation to airway muscles, which keeps the airway open during sleep.  The system consists of three fully implanted components: a small generator (similar in size to a pacemaker), a breathing sensor, and a stimulation lead.  Using a small handheld remote, a patient turns the therapy on before bed and off upon awakening.    Candidates for this device must be greater than 18 years of age, have moderate to severe obstructive sleep apnea with less than 25% central events  (AHI between 15-65), BMI less than 35, have tried CPAP/oral appliance for at least 8 weeks without success, and have appropriate upper airway anatomy (determined by a sleep endoscopy performed by Dr. Panda Villalobos or Dr. Kendell Alicea).     Nasal Procedures:  Nasal obstruction can interfere with nasal breathing during the day and night.  Studies have shown that relief of nasal obstruction can improve the ability of some patients to  tolerate positive airway pressure therapy for obstructive sleep apnea1.  Treatment options include medications such as nasal saline, topical corticosteroid and antihistamine sprays, and oral medications such as antihistamines or decongestants. Non-surgical treatments can include external nasal dilators for selected patients. If these are not successful by themselves, surgery can improve the nasal airway either alone or in combination with these other options.        Combination Procedures:  Combination of surgical procedures and other treatments may be recommended, particularly if patients have more than one area of narrowing or persistent positional disease.  The success rate of combination surgery ranges from 66-80%2,3.    References  Melissa YANG. The Role of the Nose in Snoring and Obstructive Sleep Apnoea: An Update.  Eur Arch Otorhinolaryngol. 2011; 268: 1365-73.   Jazlyn SM; Logan JA; Sandra JR; Pallanch JF; Luiza MB; Horacio SG; Benedict PA. Surgical modifications of the upper airway for obstructive sleep apnea in adults: a systematic review and meta-analysis. SLEEP 2010;33(10):9812-3896. Benton SHARPE. Hypopharyngeal surgery in obstructive sleep apnea: an evidence-based medicine review.  Arch Otolaryngol Head Neck Surg. 2006 Feb;132(2):206-13.  Garret YH1, Reagan Y, Remy CARLOS. The efficacy of anatomically based multilevel surgery for obstructive sleep apnea. Otolaryngol Head Neck Surg. 2003 Oct;129(4):327-35.  Benton SHARPE, Goldberg A. Hypopharyngeal Surgery in Obstructive Sleep Apnea: An Evidence-Based Medicine Review. Arch Otolaryngol Head Neck Surg. 2006 Feb;132(2):206-13.  Wilberto ZAMBRANO et al. Upper-Airway Stimulation for Obstructive Sleep Apnea.  N Engl J Med. 2014 Jan 9;370(2):139-49.  Kelin Y et al. Increased Incidence of Cardiovascular Disease in Middle-aged Men with Obstructive Sleep Apnea. Am J Respir Crit Care Med; 2002 166: 159-165  Kristy DURBIN et al. Studying Life Effects and Effectiveness of  Palatopharyngoplasty (SLEEP) study: Subjective Outcomes of Isolated Uvulopalatopharyngoplasty. Otolaryngol Head Neck Surg. 2011; 144: 623-631.        WHAT IF I ONLY HAVE SNORING?    Mandibular advancement devices, lateral sleep positioning, long-term weight loss and treatment of nasal allergies have been shown to improve snoring.  Exercising tongue muscles with a game (https://apps.[employer]ner[employer]/us/katie/Tagasauris-reduce-snoring/ei6763072800) or stimulating the tongue during the day with a device (https://doi.org/10.3390/fzg90190260) have improved snoring in some individuals.  https://www.[employer]/  https://www.sleepfoundation.org/best-anti-snoring-mouthpieces-and-mouthguards    Remember to Drive Safe... Drive Alive     Sleep health profoundly affects your health, mood, and your safety.  Thirty three percent of the population (one in three of us) is not getting enough sleep and many have a sleep disorder. Not getting enough sleep or having an untreated / undertreated sleep condition may make us sleepy without even knowing it. In fact, our driving could be dramatically impaired due to our sleep health. As your provider, here are some things I would like you to know about driving:     Here are some warning signs for impairment and dangerous drowsy driving:              -Having been awake more than 16 hours               -Looking tired               -Eyelid drooping              -Head nodding (it could be too late at this point)              -Driving for more than 30 minutes     Some things you could do to make the driving safer if you are experiencing some drowsiness:              -Stop driving and rest              -Call for transportation              -Make sure your sleep disorder is adequately treated     Some things that have been shown NOT to work when experiencing drowsiness while driving:              -Turning on the radio              -Opening windows              -Eating any  distracting  /  entertaining   foods (e.g., sunflower seeds, candy, or any other)              -Talking on the phone      Your decision may not only impact your life, but also the life of others. Please, remember to drive safe for yourself and all of us.

## 2025-06-26 NOTE — NURSING NOTE
Current patient location: [address]  [facility] [address]    Is the patient currently in the state of [place]? YES    Visit mode: VIDEO    If the visit is dropped, the patient can be reconnected by:VIDEO VISIT: Text to cell phone:   Telephone Information:   Mobile [phone]       Will anyone else be joining the visit? NO  (If patient encounters technical issues they should call [phone]    Are changes needed to the allergy or medication list? Pt stated no changes to allergies and Pt stated no med changes    Are refills needed on medications prescribed by this physician? NO    Rooming Documentation:  Questionnaire(s) completed    Reason for visit: Consult    Cecilia ELAINE

## (undated) DEVICE — SYR EAR BULB 3OZ 0035830

## (undated) DEVICE — GLOVE PROTEXIS W/NEU-THERA 7.5  2D73TE75

## (undated) DEVICE — GLOVE PROTEXIS BLUE W/NEU-THERA 7.5  2D73EB75

## (undated) DEVICE — SUCTION CANISTER MEDIVAC LINER 3000ML W/LID 65651-530

## (undated) DEVICE — TUBE ENDOTRACHEAL NIM EMG 8.0MM 8229308

## (undated) DEVICE — TUBE ENDOTRACHEAL NIM EMG 7.0MM 8229307

## (undated) DEVICE — PACK UNIVERSAL SPLIT 29131

## (undated) DEVICE — SU VICRYL 2-0 TIE 12X18" J905T

## (undated) DEVICE — DRSG TEGADERM 4X4 3/4" 1626

## (undated) DEVICE — ESU PENCIL W/SMOKE EVAC CVPLP2000

## (undated) DEVICE — DRSG STERI STRIP 1/4X3" R1541

## (undated) DEVICE — ESU GROUND PAD UNIVERSAL W/O CORD

## (undated) DEVICE — SU MONOCRYL 4-0 P-3 18" UND Y494G

## (undated) DEVICE — ESU PENCIL W/SMOKE EVAC NEPTUNE STRYKER 0703-046-000

## (undated) DEVICE — SU VICRYL 4-0 TIE 12X18" DYED J103T

## (undated) DEVICE — PACK MINOR SBA15MIFSE

## (undated) DEVICE — SOL NACL 0.9% IRRIG 1000ML BOTTLE 2F7124

## (undated) DEVICE — SOL WATER IRRIG 1000ML BOTTLE 2F7114

## (undated) DEVICE — DRSG GAUZE 4X4" 3033

## (undated) DEVICE — BLADE KNIFE SURG 15 371115

## (undated) DEVICE — LINEN TOWEL PACK X5 5464

## (undated) DEVICE — DRSG STERI STRIP 1/2X4" R1547

## (undated) DEVICE — SPONGE RAY-TEC 4X8" 7318

## (undated) DEVICE — ESU HOLSTER PLASTIC DISP E2400

## (undated) DEVICE — SU SILK 3-0 SH 30" K832H

## (undated) DEVICE — PREP CHLORAPREP W/ORANGE TINT 10.5ML 930715

## (undated) DEVICE — SU VICRYL 3-0 SH 27" UND J416H

## (undated) DEVICE — CATH TRAY FOLEY COUDE SURESTEP 16FR W/URNE MTR STLK A304716A

## (undated) DEVICE — ESU HANDPIECE THUNDERBEAT ENDOSCOPIC FINE JAW TB-00090F

## (undated) DEVICE — NIM PROBE PRASS STIMULATOR PROTECTED TIP 8225101

## (undated) DEVICE — DECANTER VIAL 2006S

## (undated) RX ORDER — REMIFENTANIL HYDROCHLORIDE 1 MG/ML
INJECTION, POWDER, LYOPHILIZED, FOR SOLUTION INTRAVENOUS
Status: DISPENSED
Start: 2021-02-02

## (undated) RX ORDER — BUPIVACAINE HYDROCHLORIDE AND EPINEPHRINE 5; 5 MG/ML; UG/ML
INJECTION, SOLUTION EPIDURAL; INTRACAUDAL; PERINEURAL
Status: DISPENSED
Start: 2021-02-02

## (undated) RX ORDER — BUPIVACAINE HYDROCHLORIDE 2.5 MG/ML
INJECTION, SOLUTION EPIDURAL; INFILTRATION; INTRACAUDAL
Status: DISPENSED
Start: 2021-02-02

## (undated) RX ORDER — PROPOFOL 10 MG/ML
INJECTION, EMULSION INTRAVENOUS
Status: DISPENSED
Start: 2021-02-02

## (undated) RX ORDER — FENTANYL CITRATE 50 UG/ML
INJECTION, SOLUTION INTRAMUSCULAR; INTRAVENOUS
Status: DISPENSED
Start: 2021-02-02

## (undated) RX ORDER — CLINDAMYCIN PHOSPHATE 900 MG/50ML
INJECTION, SOLUTION INTRAVENOUS
Status: DISPENSED
Start: 2021-02-02

## (undated) RX ORDER — HYDROMORPHONE HYDROCHLORIDE 1 MG/ML
INJECTION, SOLUTION INTRAMUSCULAR; INTRAVENOUS; SUBCUTANEOUS
Status: DISPENSED
Start: 2021-02-02